# Patient Record
Sex: FEMALE
[De-identification: names, ages, dates, MRNs, and addresses within clinical notes are randomized per-mention and may not be internally consistent; named-entity substitution may affect disease eponyms.]

---

## 2017-07-24 ENCOUNTER — RX ONLY (RX ONLY)
Age: 62
End: 2017-07-24

## 2019-04-29 PROBLEM — D48.5 NEOPLASM OF UNCERTAIN BEHAVIOR OF SKIN: Status: ACTIVE | Noted: 2019-04-29

## 2019-11-12 ENCOUNTER — RX ONLY (RX ONLY)
Age: 64
End: 2019-11-12

## 2019-11-12 ENCOUNTER — DOCTOR'S OFFICE (OUTPATIENT)
Dept: URBAN - METROPOLITAN AREA CLINIC 137 | Facility: CLINIC | Age: 64
Setting detail: OPHTHALMOLOGY
End: 2019-11-12

## 2019-11-12 DIAGNOSIS — H52.223: ICD-10-CM

## 2019-11-12 DIAGNOSIS — H52.03: ICD-10-CM

## 2019-11-12 DIAGNOSIS — H25.13: ICD-10-CM

## 2019-11-12 PROCEDURE — SELFPAYVIS VISION VISIT SELF PAY: Performed by: OPTOMETRIST

## 2019-11-12 ASSESSMENT — SPHEQUIV_DERIVED
OD_SPHEQUIV: 6.25
OS_SPHEQUIV: 4.25
OS_SPHEQUIV: 4.25
OD_SPHEQUIV: 6

## 2019-11-12 ASSESSMENT — REFRACTION_CURRENTRX
OD_ADD: +2.75
OD_VPRISM_DIRECTION: PROGS
OS_CYLINDER: SPHERE
OS_OVR_VA: 20/
OD_CYLINDER: -1.75
OS_VPRISM_DIRECTION: PROGS
OD_AXIS: 084
OS_OVR_VA: 20/
OD_OVR_VA: 20/
OD_SPHERE: +6.75
OS_SPHERE: +3.75
OD_OVR_VA: 20/
OS_ADD: +2.75
OD_OVR_VA: 20/
OS_OVR_VA: 20/

## 2019-11-12 ASSESSMENT — REFRACTION_MANIFEST
OS_VA3: 20/
OS_CYLINDER: -0.50
OD_AXIS: 85
OS_ADD: +2.50
OD_VA2: 20/
OU_VA: 20/
OS_VA2: 20/
OS_VA1: 20/20
OS_VA2: 20/
OD_ADD: +2.50
OS_VA3: 20/
OU_VA: 20/
OS_SPHERE: +4.50
OS_AXIS: 155
OD_VA3: 20/
OD_SPHERE: +6.75
OS_VA1: 20/
OD_VA1: 20/40
OD_VA2: 20/
OD_CYLINDER: -1.50
OD_VA1: 20/
OD_VA3: 20/

## 2019-11-12 ASSESSMENT — KERATOMETRY
OS_K2POWER_DIOPTERS: 45.00
OS_AXISANGLE_DEGREES: 004
OD_K1POWER_DIOPTERS: 43.75
OD_AXISANGLE_DEGREES: 080
OD_K2POWER_DIOPTERS: 45.00
OS_K1POWER_DIOPTERS: 44.00

## 2019-11-12 ASSESSMENT — CONFRONTATIONAL VISUAL FIELD TEST (CVF)
OD_FINDINGS: FULL
OS_FINDINGS: FULL

## 2019-11-12 ASSESSMENT — AXIALLENGTH_DERIVED
OS_AL: 21.7286
OS_AL: 21.7286
OD_AL: 21.1238
OD_AL: 21.2022

## 2019-11-12 ASSESSMENT — REFRACTION_AUTOREFRACTION
OD_AXIS: 088
OS_SPHERE: +4.50
OS_CYLINDER: -0.50
OD_CYLINDER: -1.00
OD_SPHERE: +6.75
OS_AXIS: 146

## 2019-11-12 ASSESSMENT — VISUAL ACUITY
OS_BCVA: 20/40-1
OD_BCVA: 20/20-1

## 2019-11-21 ENCOUNTER — DOCTOR'S OFFICE (OUTPATIENT)
Dept: URBAN - METROPOLITAN AREA CLINIC 137 | Facility: CLINIC | Age: 64
Setting detail: OPHTHALMOLOGY
End: 2019-11-21
Payer: COMMERCIAL

## 2019-11-21 DIAGNOSIS — H25.13: ICD-10-CM

## 2019-11-21 DIAGNOSIS — H04.121: ICD-10-CM

## 2019-11-21 DIAGNOSIS — H01.004: ICD-10-CM

## 2019-11-21 DIAGNOSIS — H04.122: ICD-10-CM

## 2019-11-21 DIAGNOSIS — H01.001: ICD-10-CM

## 2019-11-21 DIAGNOSIS — H04.123: ICD-10-CM

## 2019-11-21 PROCEDURE — 83861 MICROFLUID ANALY TEARS: CPT | Performed by: OPTOMETRIST

## 2019-11-21 PROCEDURE — 99213 OFFICE O/P EST LOW 20 MIN: CPT | Performed by: OPTOMETRIST

## 2019-11-21 ASSESSMENT — REFRACTION_MANIFEST
OU_VA: 20/
OD_SPHERE: +6.75
OS_CYLINDER: -0.50
OD_VA3: 20/
OS_VA1: 20/20
OD_AXIS: 85
OD_ADD: +2.50
OS_VA2: 20/
OS_VA1: 20/
OS_VA3: 20/
OD_VA1: 20/40
OS_VA3: 20/
OS_SPHERE: +4.50
OD_VA1: 20/
OS_ADD: +2.50
OU_VA: 20/
OD_CYLINDER: -1.50
OD_VA2: 20/
OD_VA3: 20/
OD_VA2: 20/
OS_AXIS: 155
OS_VA2: 20/

## 2019-11-21 ASSESSMENT — SPHEQUIV_DERIVED
OS_SPHEQUIV: 4.25
OD_SPHEQUIV: 6.25
OS_SPHEQUIV: 4.25
OD_SPHEQUIV: 6

## 2019-11-21 ASSESSMENT — REFRACTION_CURRENTRX
OD_OVR_VA: 20/
OS_ADD: +2.75
OD_ADD: +2.75
OD_SPHERE: +6.75
OS_SPHERE: +3.75
OS_VPRISM_DIRECTION: PROGS
OD_VPRISM_DIRECTION: PROGS
OS_OVR_VA: 20/
OS_OVR_VA: 20/
OD_OVR_VA: 20/
OS_OVR_VA: 20/
OD_OVR_VA: 20/
OD_CYLINDER: -1.75
OS_CYLINDER: SPHERE
OD_AXIS: 084

## 2019-11-21 ASSESSMENT — REFRACTION_AUTOREFRACTION
OS_AXIS: 146
OD_AXIS: 088
OS_CYLINDER: -0.50
OD_CYLINDER: -1.00
OD_SPHERE: +6.75
OS_SPHERE: +4.50

## 2019-11-21 ASSESSMENT — CONFRONTATIONAL VISUAL FIELD TEST (CVF)
OD_FINDINGS: FULL
OS_FINDINGS: FULL

## 2019-11-21 ASSESSMENT — VISUAL ACUITY
OS_BCVA: 20/40-1
OD_BCVA: 20/25-1

## 2019-12-06 ENCOUNTER — OFFICE VISIT (OUTPATIENT)
Dept: INTERNAL MEDICINE CLINIC | Facility: CLINIC | Age: 64
End: 2019-12-06
Payer: COMMERCIAL

## 2019-12-06 VITALS
DIASTOLIC BLOOD PRESSURE: 76 MMHG | OXYGEN SATURATION: 96 % | RESPIRATION RATE: 16 BRPM | SYSTOLIC BLOOD PRESSURE: 134 MMHG | BODY MASS INDEX: 30.92 KG/M2 | WEIGHT: 197 LBS | HEIGHT: 67 IN | HEART RATE: 101 BPM | TEMPERATURE: 98.1 F

## 2019-12-06 DIAGNOSIS — Z12.39 SCREENING FOR MALIGNANT NEOPLASM OF BREAST: ICD-10-CM

## 2019-12-06 DIAGNOSIS — Z13.21 ENCOUNTER FOR SCREENING FOR NUTRITIONAL DISORDER: ICD-10-CM

## 2019-12-06 DIAGNOSIS — M79.7 FIBROMYALGIA: ICD-10-CM

## 2019-12-06 DIAGNOSIS — Z00.00 HEALTHCARE MAINTENANCE: Primary | ICD-10-CM

## 2019-12-06 DIAGNOSIS — F33.0 MILD EPISODE OF RECURRENT MAJOR DEPRESSIVE DISORDER (HCC): ICD-10-CM
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
## 2019-12-06 DIAGNOSIS — L65.9 HAIR LOSS: ICD-10-CM

## 2019-12-06 DIAGNOSIS — Z11.59 ENCOUNTER FOR HEPATITIS C SCREENING TEST FOR LOW RISK PATIENT: ICD-10-CM

## 2019-12-06 DIAGNOSIS — E66.09 CLASS 1 OBESITY DUE TO EXCESS CALORIES WITHOUT SERIOUS COMORBIDITY WITH BODY MASS INDEX (BMI) OF 31.0 TO 31.9 IN ADULT: ICD-10-CM

## 2019-12-06 DIAGNOSIS — Z12.11 SCREENING FOR MALIGNANT NEOPLASM OF COLON: ICD-10-CM

## 2019-12-06 PROBLEM — F32.A DEPRESSION: Status: ACTIVE | Noted: 2019-12-06

## 2019-12-06 PROCEDURE — 99386 PREV VISIT NEW AGE 40-64: CPT | Performed by: NURSE PRACTITIONER

## 2019-12-06 PROCEDURE — 1036F TOBACCO NON-USER: CPT | Performed by: NURSE PRACTITIONER

## 2019-12-06 PROCEDURE — 99214 OFFICE O/P EST MOD 30 MIN: CPT | Performed by: NURSE PRACTITIONER

## 2019-12-06 PROCEDURE — 3008F BODY MASS INDEX DOCD: CPT | Performed by: NURSE PRACTITIONER

## 2019-12-06 RX ORDER — BUPROPION HYDROCHLORIDE 150 MG/1
150 TABLET ORAL DAILY
Qty: 30 TABLET | Refills: 2 | Status: SHIPPED | OUTPATIENT
Start: 2019-12-06 | End: 2020-04-15 | Stop reason: SDUPTHER

## 2019-12-06 RX ORDER — DICYCLOMINE HYDROCHLORIDE 10 MG/1
10 CAPSULE ORAL
COMMUNITY
End: 2021-05-27 | Stop reason: SDUPTHER

## 2019-12-06 RX ORDER — BUPROPION HYDROCHLORIDE 150 MG/1
150 TABLET ORAL DAILY
COMMUNITY
End: 2019-12-06 | Stop reason: SDUPTHER

## 2019-12-06 RX ORDER — MONTELUKAST SODIUM 10 MG/1
10 TABLET ORAL
COMMUNITY
End: 2021-01-19 | Stop reason: SDUPTHER

## 2019-12-06 RX ORDER — CYCLOBENZAPRINE HCL 5 MG
5 TABLET ORAL
COMMUNITY

## 2019-12-06 RX ORDER — LEVOCETIRIZINE DIHYDROCHLORIDE 5 MG/1
5 TABLET, FILM COATED ORAL EVERY EVENING
COMMUNITY
End: 2021-05-28 | Stop reason: ALTCHOICE

## 2019-12-06 NOTE — PROGRESS NOTES
Assessment/Plan:    Depression  Continue wellbutrin    Tick bite  Watch for bulls eye rash and flu like symptoms  Check for lyme disease after 4 weeks    Fibromyalgia  Takes tramadol prn    Hair loss  Blood work ordered  Try biotin and mvi       Diagnoses and all orders for this visit:    Healthcare maintenance  -     Lipid panel; Future    Screening for malignant neoplasm of breast  -     Mammo screening bilateral w cad; Future    Tick bite, initial encounter  -     Lyme Antibody Profile with reflex to WB; Future    Screening for malignant neoplasm of colon  -     Ambulatory referral to Gastroenterology; Future    Hair loss  -     Comprehensive metabolic panel; Future  -     CBC and differential; Future  -     TSH, 3rd generation with Free T4 reflex; Future  -     Ferritin; Future    Encounter for screening for nutritional disorder  -     Vitamin D 25 hydroxy; Future    Encounter for hepatitis C screening test for low risk patient  -     Hepatitis C antibody; Future    Mild episode of recurrent major depressive disorder (HCC)  -     buPROPion (WELLBUTRIN XL) 150 mg 24 hr tablet; Take 1 tablet (150 mg total) by mouth daily    Class 1 obesity due to excess calories without serious comorbidity with body mass index (BMI) of 31 0 to 31 9 in adult    Fibromyalgia    Other orders  -     Milnacipran HCl (SAVELLA) 50 MG TABS; Take by mouth 2 (two) times a day  -     cyclobenzaprine (FLEXERIL) 5 mg tablet; Take 5 mg by mouth daily at bedtime  -     Discontinue: buPROPion (WELLBUTRIN XL) 150 mg 24 hr tablet; Take 150 mg by mouth daily  -     montelukast (SINGULAIR) 10 mg tablet; Take 10 mg by mouth daily at bedtime  -     levocetirizine (XYZAL) 5 MG tablet; Take 5 mg by mouth every evening  -     calcium-vitamin D (OSCAL) 250-125 MG-UNIT per tablet; Take 3 tablets by mouth daily  -     dicyclomine (BENTYL) 10 mg capsule;  Take 10 mg by mouth 4 (four) times a day (before meals and at bedtime)          Subjective:      Patient ID: Leobardo Beltran is a 59 y o  female  Patient is here to establish care    Has a tick bite left upper arm  No flu like symptoms, no bulls eye rash  she removed the tick  Its been about a week    Depression- takes wellbutrin  Stable    Fibromyalgia- takes tramadol prn    Hair loss- she is under stress but she is losing more hair recently  She moved from [facility] recently and she is under stress      The following portions of the patient's history were reviewed and updated as appropriate: allergies, current medications, past family history, past medical history, past social history, past surgical history and problem list     Review of Systems   Constitutional: Negative  HENT: Negative  Eyes: Negative  Respiratory: Negative  Cardiovascular: Negative  Gastrointestinal: Negative  Musculoskeletal: Negative  Neurological: Negative  Objective:      /76   Pulse 101   Temp 98 1 °F (36 7 °C) (Oral)   Resp 16   Ht 5' 6 73" (1 695 m)   Wt 89 4 kg (197 lb)   SpO2 96%   BMI 31 10 kg/m²          Physical Exam   Constitutional: She is oriented to person, place, and time  She appears well-developed and well-nourished  HENT:   Head: Normocephalic and atraumatic  Right Ear: External ear normal    Left Ear: External ear normal    Nose: Nose normal    Mouth/Throat: Oropharynx is clear and moist    Eyes: Pupils are equal, round, and reactive to light  Conjunctivae are normal    Neck: Normal range of motion  Neck supple  Cardiovascular: Normal rate and regular rhythm  Pulmonary/Chest: Effort normal and breath sounds normal    Abdominal: Soft  Bowel sounds are normal    Musculoskeletal: Normal range of motion  Neurological: She is alert and oriented to person, place, and time  Skin: Skin is warm and dry  Nursing note and vitals reviewed

## 2019-12-06 NOTE — PATIENT INSTRUCTIONS
Eat healthy, less junk food and sodium  Drink more water stay hydrated  Wait four weeks before going for bloodwork  Add MVI and biotin for hair loss  Wellness Visit for Adults   AMBULATORY CARE:   A wellness visit  is when you see your healthcare provider to get screened for health problems  You can also get advice on how to stay healthy  Write down your questions so you remember to ask them  Ask your healthcare provider how often you should have a wellness visit  What happens at a wellness visit:  Your healthcare provider will ask about your health, and your family history of health problems  This includes high blood pressure, heart disease, and cancer  He or she will ask if you have symptoms that concern you, if you smoke, and about your mood  You may also be asked about your intake of medicines, supplements, food, and alcohol  Any of the following may be done:  · Your weight  will be checked  Your height may also be checked so your body mass index (BMI) can be calculated  Your BMI shows if you are at a healthy weight  · Your blood pressure  and heart rate will be checked  Your temperature may also be checked  · Blood and urine tests  may be done  Blood tests may be done to check your cholesterol levels  Abnormal cholesterol levels increase your risk for heart disease and stroke  You may also need a blood or urine test to check for diabetes if you are at increased risk  Urine tests may be done to look for signs of an infection or kidney disease  · A physical exam  includes checking your heartbeat and lungs with a stethoscope  Your healthcare provider may also check your skin to look for sun damage  · Screening tests  may be recommended  A screening test is done to check for diseases that may not cause symptoms  The screening tests you may need depend on your age, gender, family history, and lifestyle habits   For example, colorectal screening may be recommended if you are 48years old or older   Screening tests you need if you are a woman:   · A Pap smear  is used to screen for cervical cancer  Pap smears are usually done every 3 to 5 years depending on your age  You may need them more often if you have had abnormal Pap smear test results in the past  Ask your healthcare provider how often you should have a Pap smear  · A mammogram  is an x-ray of your breasts to screen for breast cancer  Experts recommend mammograms every 2 years starting at age 48 years  You may need a mammogram at age 52 years or younger if you have an increased risk for breast cancer  Talk to your healthcare provider about when you should start having mammograms and how often you need them  Vaccines you may need:   · Get an influenza vaccine  every year  The influenza vaccine protects you from the flu  Several types of viruses cause the flu  The viruses change over time, so new vaccines are made each year  · Get a tetanus-diphtheria (Td) booster vaccine  every 10 years  This vaccine protects you against tetanus and diphtheria  Tetanus is a severe infection that may cause painful muscle spasms and lockjaw  Diphtheria is a severe bacterial infection that causes a thick covering in the back of your mouth and throat  · Get a human papillomavirus (HPV) vaccine  if you are female and aged 23 to 32 or male 23 to 24 and never received it  This vaccine protects you from HPV infection  HPV is the most common infection spread by sexual contact  HPV may also cause vaginal, penile, and anal cancers  · Get a pneumococcal vaccine  if you are aged 72 years or older  The pneumococcal vaccine is an injection given to protect you from pneumococcal disease  Pneumococcal disease is an infection caused by pneumococcal bacteria  The infection may cause pneumonia, meningitis, or an ear infection  · Get a shingles vaccine  if you are aged 61 or older, even if you have had shingles before   The shingles vaccine is an injection to protect you from the varicella-zoster virus  This is the same virus that causes chickenpox  Shingles is a painful rash that develops in people who had chickenpox or have been exposed to the virus  How to eat healthy:  My Plate is a model for planning healthy meals  It shows the types and amounts of foods that should go on your plate  Fruits and vegetables make up about half of your plate, and grains and protein make up the other half  A serving of dairy is included on the side of your plate  The amount of calories and serving sizes you need depends on your age, gender, weight, and height  Examples of healthy foods are listed below:  · Eat a variety of vegetables  such as dark green, red, and orange vegetables  You can also include canned vegetables low in sodium (salt) and frozen vegetables without added butter or sauces  · Eat a variety of fresh fruits , canned fruit in 100% juice, frozen fruit, and dried fruit  · Include whole grains  At least half of the grains you eat should be whole grains  Examples include whole-wheat bread, wheat pasta, brown rice, and whole-grain cereals such as oatmeal     · Eat a variety of protein foods such as seafood (fish and shellfish), lean meat, and poultry without skin (turkey and chicken)  Examples of lean meats include pork leg, shoulder, or tenderloin, and beef round, sirloin, tenderloin, and extra lean ground beef  Other protein foods include eggs and egg substitutes, beans, peas, soy products, nuts, and seeds  · Choose low-fat dairy products such as skim or 1% milk or low-fat yogurt, cheese, and cottage cheese  · Limit unhealthy fats  such as butter, hard margarine, and shortening  Exercise:  Exercise at least 30 minutes per day on most days of the week  Some examples of exercise include walking, biking, dancing, and swimming  You can also fit in more physical activity by taking the stairs instead of the elevator or parking farther away from stores  Include muscle strengthening activities 2 days each week  Regular exercise provides many health benefits  It helps you manage your weight, and decreases your risk for type 2 diabetes, heart disease, stroke, and high blood pressure  Exercise can also help improve your mood  Ask your healthcare provider about the best exercise plan for you  General health and safety guidelines:   · Do not smoke  Nicotine and other chemicals in cigarettes and cigars can cause lung damage  Ask your healthcare provider for information if you currently smoke and need help to quit  E-cigarettes or smokeless tobacco still contain nicotine  Talk to your healthcare provider before you use these products  · Limit alcohol  A drink of alcohol is 12 ounces of beer, 5 ounces of wine, or 1½ ounces of liquor  · Lose weight, if needed  Being overweight increases your risk of certain health conditions  These include heart disease, high blood pressure, type 2 diabetes, and certain types of cancer  · Protect your skin  Do not sunbathe or use tanning beds  Use sunscreen with a SPF 15 or higher  Apply sunscreen at least 15 minutes before you go outside  Reapply sunscreen every 2 hours  Wear protective clothing, hats, and sunglasses when you are outside  · Drive safely  Always wear your seatbelt  Make sure everyone in your car wears a seatbelt  A seatbelt can save your life if you are in an accident  Do not use your cell phone when you are driving  This could distract you and cause an accident  Pull over if you need to make a call or send a text message  · Practice safe sex  Use latex condoms if are sexually active and have more than one partner  Your healthcare provider may recommend screening tests for sexually transmitted infections (STIs)  · Wear helmets, lifejackets, and protective gear  Always wear a helmet when you ride a bike or motorcycle, go skiing, or play sports that could cause a head injury   Wear protective equipment when you play sports  Wear a lifejacket when you are on a boat or doing water sports  © 2017 [employer] Information is for End User's use only and may not be sold, redistributed or otherwise used for commercial purposes  All illustrations and images included in CareNotes® are the copyrighted property of [employer]  or [employer]i [employer]  The above information is an  only  It is not intended as medical advice for individual conditions or treatments  Talk to your doctor, nurse or pharmacist before following any medical regimen to see if it is safe and effective for you
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
## 2019-12-08 PROBLEM — L65.9 HAIR LOSS: Status: ACTIVE | Noted: 2019-12-08

## 2019-12-08 NOTE — PROGRESS NOTES
[facility]    NAME: Erin Hernández  AGE: 59 y o  SEX: female  : 1955     DATE: 2019     Assessment and Plan:     Problem List Items Addressed This Visit        Musculoskeletal and Integument    Tick bite     Watch for bulls eye rash and flu like symptoms  Check for lyme disease after 4 weeks         Relevant Orders    Lyme Antibody Profile with reflex to WB       Other    Fibromyalgia     Takes tramadol prn         Depression     Continue wellbutrin         Relevant Medications    Milnacipran HCl (SAVELLA) 50 MG TABS    buPROPion (WELLBUTRIN XL) 150 mg 24 hr tablet    Hair loss     Blood work ordered  Try biotin and mvi         Relevant Orders    Comprehensive metabolic panel    CBC and differential    TSH, 3rd generation with Free T4 reflex    Ferritin      Other Visit Diagnoses     Healthcare maintenance    -  Primary    Relevant Orders    Lipid panel    Screening for malignant neoplasm of breast        Relevant Orders    Mammo screening bilateral w cad    Screening for malignant neoplasm of colon        Relevant Orders    Ambulatory referral to Gastroenterology    Encounter for screening for nutritional disorder        Relevant Orders    Vitamin D 25 hydroxy    Encounter for hepatitis C screening test for low risk patient        Relevant Orders    Hepatitis C antibody    Class 1 obesity due to excess calories without serious comorbidity with body mass index (BMI) of 31 0 to 31 9 in adult              Immunizations and preventive care screenings were discussed with patient today  Appropriate education was printed on patient's after visit summary  Counseling:  · Exercise: the importance of regular exercise/physical activity was discussed  Recommend exercise 3-5 times per week for at least 30 minutes  Return in about 6 months (around 2020)       Chief Complaint:     Chief Complaint   Patient presents with  Annual Exam    Tick Removal     The patient had a tick on her left arm  History of Present Illness:     Adult Annual Physical   Patient here for a comprehensive physical exam  The patient reports no problems  Diet and Physical Activity  · Diet/Nutrition: well balanced diet  · Exercise: walking  Depression Screening  PHQ-9 Depression Screening    PHQ-9:    Frequency of the following problems over the past two weeks:       Little interest or pleasure in doing things:  1 - several days  Feeling down, depressed, or hopeless:  1 - several days  PHQ-2 Score:  2       General Health  · Sleep: sleeps well  · Hearing: normal - bilateral   · Vision: no vision problems  · Dental: regular dental visits  ·       Review of Systems:     Review of Systems   Constitutional: Negative  HENT: Negative  Eyes: Negative  Respiratory: Negative  Cardiovascular: Negative  Gastrointestinal: Negative  Musculoskeletal: Positive for myalgias  Neurological: Negative         Past Medical History:     Past Medical History:   Diagnosis Date    Allergic     Arthritis     Depression     Fibromyalgia     Inflammatory bowel disease     Visual impairment       Past Surgical History:     Past Surgical History:   Procedure Laterality Date    HYSTERECTOMY      REPAIR RECTOCELE        Social History:     Social History     Socioeconomic History    Marital status: /Civil Union     Spouse name: None    Number of children: None    Years of education: None    Highest education level: None   Occupational History    None   Social Needs    Financial resource strain: None    Food insecurity:     Worry: None     Inability: None    Transportation needs:     Medical: None     Non-medical: None   Tobacco Use    Smoking status: Former Smoker     Packs/day: 1 00     Years: 8 00     Pack years: 8 00     Types: Cigarettes     Start date: 12/6/1971     Last attempt to quit: 12/6/1979     Years since quittin 0    Smokeless tobacco: Never Used   Substance and Sexual Activity    Alcohol use: Yes     Frequency: Monthly or less     Drinks per session: 1 or 2    Drug use: Never    Sexual activity: Yes   Lifestyle    Physical activity:     Days per week: None     Minutes per session: None    Stress: None   Relationships    Social connections:     Talks on phone: None     Gets together: None     Attends Yazidi service: None     Active member of club or organization: None     Attends meetings of clubs or organizations: None     Relationship status: None    Intimate partner violence:     Fear of current or ex partner: None     Emotionally abused: None     Physically abused: None     Forced sexual activity: None   Other Topics Concern    None   Social History Narrative    None      Family History:     Family History   Problem Relation Age of Onset    Cancer Mother     Dementia Father     Heart disease Father     Arthritis Father     Skin cancer Father     Skin cancer Sister     Alcohol abuse Brother       Current Medications:     Current Outpatient Medications   Medication Sig Dispense Refill    buPROPion (WELLBUTRIN XL) 150 mg 24 hr tablet Take 1 tablet (150 mg total) by mouth daily 30 tablet 2    calcium-vitamin D (OSCAL) 250-125 MG-UNIT per tablet Take 3 tablets by mouth daily      cyclobenzaprine (FLEXERIL) 5 mg tablet Take 5 mg by mouth daily at bedtime      dicyclomine (BENTYL) 10 mg capsule Take 10 mg by mouth 4 (four) times a day (before meals and at bedtime)      levocetirizine (XYZAL) 5 MG tablet Take 5 mg by mouth every evening      Milnacipran HCl (SAVELLA) 50 MG TABS Take by mouth 2 (two) times a day      montelukast (SINGULAIR) 10 mg tablet Take 10 mg by mouth daily at bedtime       No current facility-administered medications for this visit  Allergies:      Allergies   Allergen Reactions    Penicillins Hives      Physical Exam:     /76   Pulse 101   Temp 98 1 °F (36 7 °C) (Oral)   Resp 16   Ht 5' 6 73" (1 695 m)   Wt 89 4 kg (197 lb)   SpO2 96%   BMI 31 10 kg/m²     Physical Exam   Constitutional: She is oriented to person, place, and time  She appears well-developed and well-nourished  HENT:   Head: Normocephalic and atraumatic  Right Ear: External ear normal    Left Ear: External ear normal    Nose: Nose normal    Mouth/Throat: Oropharynx is clear and moist    Eyes: Pupils are equal, round, and reactive to light  Conjunctivae are normal    Neck: Normal range of motion  Neck supple  Cardiovascular: Normal rate and regular rhythm  Pulmonary/Chest: Effort normal and breath sounds normal    Abdominal: Soft  Bowel sounds are normal    Musculoskeletal: Normal range of motion  Neurological: She is alert and oriented to person, place, and time  Skin: Skin is warm and dry  Nursing note and vitals reviewed        MIKAL Galaviz  [facility]

## 2020-01-20 ENCOUNTER — DOCTOR'S OFFICE (OUTPATIENT)
Dept: URBAN - METROPOLITAN AREA CLINIC 136 | Facility: CLINIC | Age: 65
Setting detail: OPHTHALMOLOGY
End: 2020-01-20
Payer: COMMERCIAL

## 2020-01-20 ENCOUNTER — RX ONLY (RX ONLY)
Age: 65
End: 2020-01-20

## 2020-01-20 DIAGNOSIS — H04.123: ICD-10-CM

## 2020-01-20 DIAGNOSIS — H25.13: ICD-10-CM

## 2020-01-20 DIAGNOSIS — H04.121: ICD-10-CM

## 2020-01-20 DIAGNOSIS — H01.004: ICD-10-CM

## 2020-01-20 DIAGNOSIS — H04.122: ICD-10-CM

## 2020-01-20 DIAGNOSIS — H01.001: ICD-10-CM

## 2020-01-20 PROCEDURE — 68761 CLOSE TEAR DUCT OPENING: CPT | Performed by: OPHTHALMOLOGY

## 2020-01-20 PROCEDURE — 92012 INTRM OPH EXAM EST PATIENT: CPT | Performed by: OPHTHALMOLOGY

## 2020-01-20 ASSESSMENT — REFRACTION_MANIFEST
OD_VA1: 20/40
OD_CYLINDER: -1.50
OU_VA: 20/
OS_SPHERE: +4.50
OS_ADD: +2.50
OS_VA3: 20/
OS_VA2: 20/
OS_AXIS: 155
OS_VA1: 20/20
OS_CYLINDER: -0.50
OD_VA3: 20/
OD_ADD: +2.50
OD_VA2: 20/
OD_SPHERE: +6.75
OD_AXIS: 85

## 2020-01-20 ASSESSMENT — REFRACTION_CURRENTRX
OS_VPRISM_DIRECTION: PROGS
OD_OVR_VA: 20/
OD_CYLINDER: -1.75
OD_VPRISM_DIRECTION: PROGS
OD_SPHERE: +6.75
OD_AXIS: 084
OS_SPHERE: +3.75
OS_OVR_VA: 20/
OS_ADD: +2.75
OS_CYLINDER: SPHERE
OD_ADD: +2.75

## 2020-01-20 ASSESSMENT — REFRACTION_AUTOREFRACTION
OS_CYLINDER: -0.50
OS_AXIS: 146
OS_SPHERE: +4.50
OD_SPHERE: +6.75
OD_AXIS: 088
OD_CYLINDER: -1.00

## 2020-01-20 ASSESSMENT — KERATOMETRY
OD_K1POWER_DIOPTERS: 43.75
OS_AXISANGLE_DEGREES: 004
OS_K2POWER_DIOPTERS: 45.00
OD_K2POWER_DIOPTERS: 45.00
OD_AXISANGLE_DEGREES: 080
OS_K1POWER_DIOPTERS: 44.00

## 2020-01-20 ASSESSMENT — SPHEQUIV_DERIVED
OD_SPHEQUIV: 6.25
OS_SPHEQUIV: 4.25
OS_SPHEQUIV: 4.25
OD_SPHEQUIV: 6

## 2020-01-20 ASSESSMENT — SUPERFICIAL PUNCTATE KERATITIS (SPK)
OD_SPK: 2+
OS_SPK: 1+

## 2020-01-20 ASSESSMENT — LID EXAM ASSESSMENTS
OD_BLEPHARITIS: RLL T
OS_BLEPHARITIS: LLL T

## 2020-01-20 ASSESSMENT — AXIALLENGTH_DERIVED
OS_AL: 21.7286
OS_AL: 21.7286
OD_AL: 21.2022
OD_AL: 21.1238

## 2020-01-20 ASSESSMENT — VISUAL ACUITY
OS_BCVA: 20/25-2
OD_BCVA: 20/20

## 2020-01-20 ASSESSMENT — CONFRONTATIONAL VISUAL FIELD TEST (CVF)
OS_FINDINGS: FULL
OD_FINDINGS: FULL

## 2020-01-31 ENCOUNTER — DOCTOR'S OFFICE (OUTPATIENT)
Dept: URBAN - METROPOLITAN AREA CLINIC 136 | Facility: CLINIC | Age: 65
Setting detail: OPHTHALMOLOGY
End: 2020-01-31
Payer: COMMERCIAL

## 2020-01-31 DIAGNOSIS — H01.001: ICD-10-CM

## 2020-01-31 DIAGNOSIS — H01.004: ICD-10-CM

## 2020-01-31 DIAGNOSIS — H04.123: ICD-10-CM

## 2020-01-31 PROBLEM — H53.001 AMBLYOPIA NOS; RIGHT EYE: Status: ACTIVE | Noted: 2019-11-12

## 2020-01-31 PROBLEM — H04.122 DRY EYE; RIGHT EYE, LEFT EYE, BOTH EYES: Status: ACTIVE | Noted: 2020-01-20

## 2020-01-31 PROBLEM — H52.03 HYPEROPIA; BOTH EYES: Status: ACTIVE | Noted: 2019-11-12

## 2020-01-31 PROBLEM — H25.13 CATARACT NUCLEAR SCLEROSIS; BOTH EYES: Status: ACTIVE | Noted: 2019-11-12

## 2020-01-31 PROBLEM — H04.121 DRY EYE; RIGHT EYE, LEFT EYE, BOTH EYES: Status: ACTIVE | Noted: 2020-01-20

## 2020-01-31 PROBLEM — H52.223 ASTIGMATISM, REGULAR; BOTH EYES: Status: ACTIVE | Noted: 2019-11-12

## 2020-01-31 PROCEDURE — 92012 INTRM OPH EXAM EST PATIENT: CPT | Performed by: OPHTHALMOLOGY

## 2020-01-31 ASSESSMENT — REFRACTION_AUTOREFRACTION
OS_SPHERE: +4.50
OS_CYLINDER: -0.50
OD_CYLINDER: -1.00
OD_SPHERE: +6.75
OD_AXIS: 088
OS_AXIS: 146

## 2020-01-31 ASSESSMENT — VISUAL ACUITY
OS_BCVA: 20/30+1
OD_BCVA: 20/20-2

## 2020-01-31 ASSESSMENT — SUPERFICIAL PUNCTATE KERATITIS (SPK)
OS_SPK: T
OD_SPK: T

## 2020-01-31 ASSESSMENT — REFRACTION_CURRENTRX
OS_CYLINDER: SPHERE
OD_CYLINDER: -1.75
OD_VPRISM_DIRECTION: PROGS
OD_SPHERE: +6.75
OD_ADD: +2.75
OD_OVR_VA: 20/
OS_VPRISM_DIRECTION: PROGS
OS_SPHERE: +3.75
OS_ADD: +2.75
OD_AXIS: 084
OS_OVR_VA: 20/

## 2020-01-31 ASSESSMENT — REFRACTION_MANIFEST
OD_VA3: 20/
OS_CYLINDER: -0.50
OS_ADD: +2.50
OS_AXIS: 155
OS_VA2: 20/
OD_SPHERE: +6.75
OS_SPHERE: +4.50
OD_CYLINDER: -1.50
OU_VA: 20/
OD_ADD: +2.50
OS_VA3: 20/
OD_VA2: 20/
OD_AXIS: 85
OD_VA1: 20/40
OS_VA1: 20/20

## 2020-01-31 ASSESSMENT — KERATOMETRY
OS_AXISANGLE_DEGREES: 004
OD_AXISANGLE_DEGREES: 080
OD_K2POWER_DIOPTERS: 45.00
OS_K1POWER_DIOPTERS: 44.00
OS_K2POWER_DIOPTERS: 45.00
OD_K1POWER_DIOPTERS: 43.75

## 2020-01-31 ASSESSMENT — SPHEQUIV_DERIVED
OD_SPHEQUIV: 6
OD_SPHEQUIV: 6.25
OS_SPHEQUIV: 4.25
OS_SPHEQUIV: 4.25

## 2020-01-31 ASSESSMENT — LID EXAM ASSESSMENTS
OS_BLEPHARITIS: LLL T
OD_BLEPHARITIS: RLL T

## 2020-01-31 ASSESSMENT — CONFRONTATIONAL VISUAL FIELD TEST (CVF)
OS_FINDINGS: FULL
OD_FINDINGS: FULL

## 2020-01-31 ASSESSMENT — AXIALLENGTH_DERIVED
OS_AL: 21.7286
OD_AL: 21.1238
OS_AL: 21.7286
OD_AL: 21.2022

## 2020-04-15 ENCOUNTER — TELEMEDICINE (OUTPATIENT)
Dept: INTERNAL MEDICINE CLINIC | Facility: CLINIC | Age: 65
End: 2020-04-15
Payer: COMMERCIAL

## 2020-04-15 DIAGNOSIS — M79.7 FIBROMYALGIA: Primary | ICD-10-CM

## 2020-04-15 DIAGNOSIS — F33.0 MILD EPISODE OF RECURRENT MAJOR DEPRESSIVE DISORDER (HCC): ICD-10-CM

## 2020-04-15 DIAGNOSIS — N95.1 SYMPTOMS, SUCH AS FLUSHING, SLEEPLESSNESS, HEADACHE, LACK OF CONCENTRATION, ASSOCIATED WITH THE MENOPAUSE: ICD-10-CM

## 2020-04-15 PROCEDURE — 99213 OFFICE O/P EST LOW 20 MIN: CPT | Performed by: NURSE PRACTITIONER

## 2020-04-15 RX ORDER — ESTERIFIED ESTROGEN AND METHYLTESTOSTERONE .625; 1.25 MG/1; MG/1
TABLET ORAL
Qty: 45 TABLET | Refills: 2 | Status: SHIPPED | OUTPATIENT
Start: 2020-04-15 | End: 2020-11-04 | Stop reason: SDUPTHER

## 2020-04-15 RX ORDER — BUPROPION HYDROCHLORIDE 150 MG/1
150 TABLET ORAL DAILY
Qty: 30 TABLET | Refills: 2 | Status: SHIPPED | OUTPATIENT
Start: 2020-04-15 | End: 2020-07-13

## 2020-04-15 RX ORDER — TRAMADOL HYDROCHLORIDE 50 MG/1
50 TABLET ORAL EVERY 6 HOURS PRN
Qty: 30 TABLET | Refills: 0 | Status: SHIPPED | OUTPATIENT
Start: 2020-04-15 | End: 2021-01-19 | Stop reason: SDUPTHER

## 2020-04-22 ENCOUNTER — DOCUMENTATION (OUTPATIENT)
Dept: INTERNAL MEDICINE CLINIC | Facility: CLINIC | Age: 65
End: 2020-04-22

## 2020-07-12 DIAGNOSIS — F33.0 MILD EPISODE OF RECURRENT MAJOR DEPRESSIVE DISORDER (HCC): ICD-10-CM

## 2020-07-13 RX ORDER — BUPROPION HYDROCHLORIDE 150 MG/1
TABLET ORAL
Qty: 90 TABLET | Refills: 0 | Status: SHIPPED | OUTPATIENT
Start: 2020-07-13 | End: 2020-11-04 | Stop reason: SDUPTHER

## 2020-07-20 ENCOUNTER — OFFICE VISIT (OUTPATIENT)
Dept: INTERNAL MEDICINE CLINIC | Facility: CLINIC | Age: 65
End: 2020-07-20
Payer: COMMERCIAL

## 2020-07-20 VITALS
SYSTOLIC BLOOD PRESSURE: 118 MMHG | DIASTOLIC BLOOD PRESSURE: 64 MMHG | HEART RATE: 106 BPM | HEIGHT: 67 IN | OXYGEN SATURATION: 98 % | BODY MASS INDEX: 31.23 KG/M2 | TEMPERATURE: 98.2 F | WEIGHT: 199 LBS

## 2020-07-20 DIAGNOSIS — R14.0 BLOATING: ICD-10-CM

## 2020-07-20 DIAGNOSIS — R42 VERTIGO: Primary | ICD-10-CM

## 2020-07-20 DIAGNOSIS — J32.0 CHRONIC MAXILLARY SINUSITIS: ICD-10-CM

## 2020-07-20 DIAGNOSIS — K59.09 OTHER CONSTIPATION: ICD-10-CM

## 2020-07-20 PROCEDURE — 3008F BODY MASS INDEX DOCD: CPT | Performed by: NURSE PRACTITIONER

## 2020-07-20 PROCEDURE — 99214 OFFICE O/P EST MOD 30 MIN: CPT | Performed by: NURSE PRACTITIONER

## 2020-07-20 RX ORDER — LACTULOSE 20 G/30ML
10 SOLUTION ORAL DAILY
Qty: 300 ML | Refills: 0 | Status: SHIPPED | OUTPATIENT
Start: 2020-07-20 | End: 2020-08-11

## 2020-07-20 RX ORDER — MECLIZINE HCL 12.5 MG/1
12.5 TABLET ORAL 3 TIMES DAILY PRN
Qty: 30 TABLET | Refills: 0 | Status: SHIPPED | OUTPATIENT
Start: 2020-07-20 | End: 2021-05-28 | Stop reason: ALTCHOICE

## 2020-07-20 RX ORDER — SIMETHICONE 125 MG
125 TABLET,CHEWABLE ORAL EVERY 8 HOURS
Qty: 30 TABLET | Refills: 0 | Status: SHIPPED | OUTPATIENT
Start: 2020-07-20 | End: 2021-05-28 | Stop reason: ALTCHOICE

## 2020-07-27 PROBLEM — R42 VERTIGO: Status: ACTIVE | Noted: 2020-07-27

## 2020-07-27 PROBLEM — K59.09 OTHER CONSTIPATION: Status: ACTIVE | Noted: 2020-07-27

## 2020-07-27 PROBLEM — J32.0 CHRONIC MAXILLARY SINUSITIS: Status: ACTIVE | Noted: 2020-07-27

## 2020-07-27 NOTE — PROGRESS NOTES
Assessment/Plan:    Other constipation  Continue bentyl  Add simethicone and lactulose  Plenty of fluids    Vertigo  Start meclizine  No sudden head movements       Diagnoses and all orders for this visit:    Vertigo  -     meclizine (ANTIVERT) 12 5 MG tablet; Take 1 tablet (12 5 mg total) by mouth 3 (three) times a day as needed for dizziness    Chronic maxillary sinusitis  -     Ambulatory Referral to Otolaryngology; Future    Bloating  -     simethicone (MYLICON) 757 MG chewable tablet; Chew 1 tablet (125 mg total) every 8 (eight) hours    Other constipation  -     lactulose 20 g/30 mL; Take 15 mL (10 g total) by mouth daily          Subjective:      Patient ID: Gregorio King is a 59 y o  female  Patient is here for sudden vertigo since this morning  Worse with head movements    No sob, chest pain, headache  Her sinuses are very congested    She is also bloated and constipated for a week  Taking miralax twice a day and not helping        The following portions of the patient's history were reviewed and updated as appropriate: allergies, current medications, past family history, past medical history, past social history, past surgical history and problem list     Review of Systems   Constitutional: Negative  HENT: Negative  Eyes: Negative  Respiratory: Negative  Cardiovascular: Negative  Gastrointestinal: Positive for abdominal distention, abdominal pain and constipation  Musculoskeletal: Negative  Neurological: Positive for dizziness  Objective:      /64   Pulse (!) 106   Temp 98 2 °F (36 8 °C)   Ht 5' 6 73" (1 695 m)   Wt 90 3 kg (199 lb)   SpO2 98%   BMI 31 42 kg/m²          Physical Exam   Constitutional: She is oriented to person, place, and time  She appears well-developed and well-nourished  HENT:   Head: Normocephalic and atraumatic     Right Ear: External ear normal    Left Ear: External ear normal    Nose: Nose normal    Mouth/Throat: Oropharynx is clear and moist    Eyes: Pupils are equal, round, and reactive to light  Conjunctivae are normal    Neck: Normal range of motion  Neck supple  Cardiovascular: Normal rate and regular rhythm  Pulmonary/Chest: Effort normal and breath sounds normal    Abdominal: Soft  Bowel sounds are normal    Musculoskeletal: Normal range of motion  Neurological: She is alert and oriented to person, place, and time  Skin: Skin is warm and dry  Nursing note and vitals reviewed

## 2020-08-11 DIAGNOSIS — K59.09 OTHER CONSTIPATION: ICD-10-CM

## 2020-08-11 RX ORDER — LACTULOSE 10 G/15ML
SOLUTION ORAL
Qty: 300 ML | Refills: 0 | Status: SHIPPED | OUTPATIENT
Start: 2020-08-11 | End: 2021-05-28 | Stop reason: ALTCHOICE

## 2020-11-04 DIAGNOSIS — F33.0 MILD EPISODE OF RECURRENT MAJOR DEPRESSIVE DISORDER (HCC): ICD-10-CM

## 2020-11-04 DIAGNOSIS — N95.1 SYMPTOMS, SUCH AS FLUSHING, SLEEPLESSNESS, HEADACHE, LACK OF CONCENTRATION, ASSOCIATED WITH THE MENOPAUSE: ICD-10-CM

## 2020-11-05 RX ORDER — BUPROPION HYDROCHLORIDE 150 MG/1
150 TABLET ORAL DAILY
Qty: 90 TABLET | Refills: 0 | Status: SHIPPED | OUTPATIENT
Start: 2020-11-05 | End: 2021-01-19 | Stop reason: SDUPTHER

## 2020-11-05 RX ORDER — ESTERIFIED ESTROGEN AND METHYLTESTOSTERONE .625; 1.25 MG/1; MG/1
TABLET ORAL
Qty: 45 TABLET | Refills: 2 | Status: SHIPPED | OUTPATIENT
Start: 2020-11-05 | End: 2021-06-01 | Stop reason: SDUPTHER

## 2021-01-19 ENCOUNTER — TELEMEDICINE (OUTPATIENT)
Dept: INTERNAL MEDICINE CLINIC | Facility: CLINIC | Age: 66
End: 2021-01-19
Payer: COMMERCIAL

## 2021-01-19 VITALS — BODY MASS INDEX: 32.18 KG/M2 | HEIGHT: 67 IN | WEIGHT: 205 LBS | TEMPERATURE: 98 F

## 2021-01-19 DIAGNOSIS — Z12.11 SCREENING FOR MALIGNANT NEOPLASM OF COLON: ICD-10-CM

## 2021-01-19 DIAGNOSIS — M79.7 FIBROMYALGIA: ICD-10-CM

## 2021-01-19 DIAGNOSIS — T78.40XA ALLERGY, INITIAL ENCOUNTER: ICD-10-CM

## 2021-01-19 DIAGNOSIS — Z12.31 ENCOUNTER FOR SCREENING MAMMOGRAM FOR MALIGNANT NEOPLASM OF BREAST: ICD-10-CM

## 2021-01-19 DIAGNOSIS — F33.0 MILD EPISODE OF RECURRENT MAJOR DEPRESSIVE DISORDER (HCC): Primary | ICD-10-CM

## 2021-01-19 PROCEDURE — 99213 OFFICE O/P EST LOW 20 MIN: CPT | Performed by: NURSE PRACTITIONER

## 2021-01-19 PROCEDURE — 3288F FALL RISK ASSESSMENT DOCD: CPT | Performed by: NURSE PRACTITIONER

## 2021-01-19 RX ORDER — TRAMADOL HYDROCHLORIDE 50 MG/1
50 TABLET ORAL EVERY 12 HOURS
Qty: 30 TABLET | Refills: 0 | Status: SHIPPED | OUTPATIENT
Start: 2021-01-19 | End: 2021-06-01 | Stop reason: SDUPTHER

## 2021-01-19 RX ORDER — METHYLPREDNISOLONE 4 MG/1
4 TABLET ORAL DAILY
COMMUNITY
Start: 2021-01-08 | End: 2021-02-07

## 2021-01-19 RX ORDER — METAXALONE 800 MG/1
800 TABLET ORAL 3 TIMES DAILY PRN
COMMUNITY
End: 2021-05-28 | Stop reason: ALTCHOICE

## 2021-01-19 RX ORDER — BUPROPION HYDROCHLORIDE 300 MG/1
300 TABLET ORAL DAILY
Qty: 90 TABLET | Refills: 2 | Status: SHIPPED | OUTPATIENT
Start: 2021-01-19 | End: 2021-11-10

## 2021-01-19 RX ORDER — MONTELUKAST SODIUM 10 MG/1
10 TABLET ORAL
Qty: 90 TABLET | Refills: 2 | Status: SHIPPED | OUTPATIENT
Start: 2021-01-19 | End: 2021-05-28 | Stop reason: ALTCHOICE

## 2021-01-19 RX ORDER — AMITRIPTYLINE HYDROCHLORIDE 25 MG/1
25 TABLET, FILM COATED ORAL
COMMUNITY
Start: 2020-12-23 | End: 2021-12-23

## 2021-01-29 NOTE — PROGRESS NOTES
Virtual Brief Visit    Assessment/Plan:    Problem List Items Addressed This Visit        Other    Fibromyalgia     Takes amitriptyline, muscle relaxers, and tramadol for pain  Doing well         Relevant Medications    traMADol (ULTRAM) 50 mg tablet    Depression - Primary     Doing well on wellbutrin         Relevant Medications    amitriptyline (ELAVIL) 25 mg tablet    buPROPion (WELLBUTRIN XL) 300 mg 24 hr tablet      Other Visit Diagnoses     Encounter for screening mammogram for malignant neoplasm of breast        Relevant Orders    Mammo screening bilateral w cad    Screening for malignant neoplasm of colon        Relevant Orders    Ambulatory referral to Gastroenterology    Allergy, initial encounter        Relevant Medications    montelukast (SINGULAIR) 10 mg tablet                Reason for visit is   Chief Complaint   Patient presents with    Virtual Brief Visit        Encounter provider MIKAL Rush    Provider located at [address] [place] [id number]    Recent Visits  No visits were found meeting these conditions  Showing recent visits within past 7 days and meeting all other requirements     Future Appointments  No visits were found meeting these conditions  Showing future appointments within next 150 days and meeting all other requirements        After connecting through [employer] and patient was informed that this is a secure, HIPAA-compliant platform  She agrees to proceed  , the patient was identified by name and date of birth  Lorenzajameson Mathieu was informed that this is a telemedicine visit and that the visit is being conducted through [facility] and patient was informed that this is a secure, HIPAA-compliant platform  She agrees to proceed     My office door was closed  No one else was in the room  She acknowledged consent and understanding of privacy and security of the platform   The patient has agreed to participate and understands she can discontinue the visit at any time  Patient is aware this is a billable service  Subjective    Fernando Greer is a 72 y o  female     Patient is here for follow up of depression    On wellbutrin and doing well    Fibromyalgia- takes tramadol, amitriptyline, and muscle relaxers    Due for colonoscopy       Past Medical History:   Diagnosis Date    Allergic     Arthritis     Depression     Fibromyalgia     Inflammatory bowel disease     Visual impairment        Past Surgical History:   Procedure Laterality Date    HYSTERECTOMY      REPAIR RECTOCELE         Current Outpatient Medications   Medication Sig Dispense Refill    amitriptyline (ELAVIL) 25 mg tablet Take 25 mg by mouth      buPROPion (WELLBUTRIN XL) 300 mg 24 hr tablet Take 1 tablet (300 mg total) by mouth daily 90 tablet 2    calcium-vitamin D (OSCAL) 250-125 MG-UNIT per tablet Take 3 tablets by mouth daily      cyclobenzaprine (FLEXERIL) 5 mg tablet Take 5 mg by mouth daily at bedtime      dicyclomine (BENTYL) 10 mg capsule Take 10 mg by mouth 4 (four) times a day (before meals and at bedtime)      estrogens, conjugated,-methyltestosterone (ESTRATEST HS) 0 625-1 25 MG per tablet Take one every other day 45 tablet 2    metaxalone (SKELAXIN) 800 mg tablet Take 800 mg by mouth Three times daily as needed      methylprednisolone (MEDROL) 4 mg tablet Take 4 mg by mouth daily      Milnacipran HCl (SAVELLA) 50 MG TABS Take by mouth 2 (two) times a day      montelukast (SINGULAIR) 10 mg tablet Take 1 tablet (10 mg total) by mouth daily at bedtime 90 tablet 2    simethicone (MYLICON) 993 MG chewable tablet Chew 1 tablet (125 mg total) every 8 (eight) hours 30 tablet 0    traMADol (ULTRAM) 50 mg tablet Take 1 tablet (50 mg total) by mouth every 12 (twelve) hours 30 tablet 0    lactulose (CHRONULAC) 10 g/15 mL solution TAKE 15 ML (10 G TOTAL) BY MOUTH DAILY (Patient not taking: Reported on 1/19/2021) 300 mL 0    levocetirizine (XYZAL) 5 MG tablet Take 5 mg by mouth every evening      meclizine (ANTIVERT) 12 5 MG tablet Take 1 tablet (12 5 mg total) by mouth 3 (three) times a day as needed for dizziness (Patient not taking: Reported on 1/19/2021) 30 tablet 0     No current facility-administered medications for this visit  Allergies   Allergen Reactions    Ciprofloxacin GI Intolerance    Nsaids GI Bleeding and GI Intolerance     Previous ulcer    Penicillins Hives       Review of Systems   Constitutional: Negative  HENT: Negative  Eyes: Negative  Respiratory: Negative  Cardiovascular: Negative  Gastrointestinal: Negative  Musculoskeletal: Negative  Neurological: Negative  Vitals:    01/19/21 1021   Temp: 98 °F (36 7 °C)   Weight: 93 kg (205 lb)   Height: 5' 6 7" (1 694 m)         I spent 15 minutes directly with the patient during this visit    VIRTUAL VISIT DISCLAIMER    Beni Holman acknowledges that she has consented to an online visit or consultation  She understands that the online visit is based solely on information provided by her, and that, in the absence of a face-to-face physical evaluation by the physician, the diagnosis she receives is both limited and provisional in terms of accuracy and completeness  This is not intended to replace a full medical face-to-face evaluation by the physician  Beni Holman understands and accepts these terms

## 2021-02-13 DIAGNOSIS — Z23 ENCOUNTER FOR IMMUNIZATION: ICD-10-CM

## 2021-02-15 ENCOUNTER — TELEPHONE (OUTPATIENT)
Dept: GASTROENTEROLOGY | Facility: CLINIC | Age: 66
End: 2021-02-15

## 2021-02-15 NOTE — TELEPHONE ENCOUNTER
Attempted to call pt for OA screening and discuss scheduling for colonoscopy, VM full  Will try again later

## 2021-03-11 ENCOUNTER — IMMUNIZATIONS (OUTPATIENT)
Dept: FAMILY MEDICINE CLINIC | Facility: HOSPITAL | Age: 66
End: 2021-03-11

## 2021-03-11 DIAGNOSIS — Z23 ENCOUNTER FOR IMMUNIZATION: Primary | ICD-10-CM

## 2021-03-11 PROCEDURE — 91300 SARS-COV-2 / COVID-19 MRNA VACCINE (PFIZER-BIONTECH) 30 MCG: CPT | Performed by: PHYSICIAN ASSISTANT

## 2021-03-11 PROCEDURE — 0001A SARS-COV-2 / COVID-19 MRNA VACCINE (PFIZER-BIONTECH) 30 MCG: CPT | Performed by: PHYSICIAN ASSISTANT

## 2021-04-02 ENCOUNTER — IMMUNIZATIONS (OUTPATIENT)
Dept: FAMILY MEDICINE CLINIC | Facility: HOSPITAL | Age: 66
End: 2021-04-02

## 2021-04-02 DIAGNOSIS — Z23 ENCOUNTER FOR IMMUNIZATION: Primary | ICD-10-CM

## 2021-04-02 PROCEDURE — 91300 SARS-COV-2 / COVID-19 MRNA VACCINE (PFIZER-BIONTECH) 30 MCG: CPT

## 2021-04-02 PROCEDURE — 0002A SARS-COV-2 / COVID-19 MRNA VACCINE (PFIZER-BIONTECH) 30 MCG: CPT

## 2021-05-27 DIAGNOSIS — K58.1 IRRITABLE BOWEL SYNDROME WITH CONSTIPATION: Primary | ICD-10-CM

## 2021-05-27 RX ORDER — DICYCLOMINE HYDROCHLORIDE 10 MG/1
10 CAPSULE ORAL
Qty: 120 CAPSULE | Refills: 2 | Status: SHIPPED | OUTPATIENT
Start: 2021-05-27 | End: 2021-08-19

## 2021-05-28 RX ORDER — FEXOFENADINE HCL 180 MG/1
TABLET ORAL
COMMUNITY
Start: 2020-12-01

## 2021-06-01 ENCOUNTER — TELEMEDICINE (OUTPATIENT)
Dept: INTERNAL MEDICINE CLINIC | Facility: CLINIC | Age: 66
End: 2021-06-01
Payer: MEDICARE

## 2021-06-01 VITALS — BODY MASS INDEX: 32.96 KG/M2 | WEIGHT: 210 LBS | HEIGHT: 67 IN

## 2021-06-01 DIAGNOSIS — R21 RASH: ICD-10-CM

## 2021-06-01 DIAGNOSIS — M79.7 FIBROMYALGIA: ICD-10-CM

## 2021-06-01 DIAGNOSIS — Z12.12 SCREENING FOR COLORECTAL CANCER: ICD-10-CM

## 2021-06-01 DIAGNOSIS — L71.9 ROSACEA: ICD-10-CM

## 2021-06-01 DIAGNOSIS — N95.1 SYMPTOMS, SUCH AS FLUSHING, SLEEPLESSNESS, HEADACHE, LACK OF CONCENTRATION, ASSOCIATED WITH THE MENOPAUSE: ICD-10-CM

## 2021-06-01 DIAGNOSIS — Z12.11 SCREENING FOR COLORECTAL CANCER: ICD-10-CM

## 2021-06-01 DIAGNOSIS — M79.7 FIBROMYALGIA: Primary | ICD-10-CM

## 2021-06-01 PROCEDURE — 99214 OFFICE O/P EST MOD 30 MIN: CPT | Performed by: NURSE PRACTITIONER

## 2021-06-01 PROCEDURE — 1160F RVW MEDS BY RX/DR IN RCRD: CPT | Performed by: NURSE PRACTITIONER

## 2021-06-01 PROCEDURE — 1036F TOBACCO NON-USER: CPT | Performed by: NURSE PRACTITIONER

## 2021-06-01 PROCEDURE — 3008F BODY MASS INDEX DOCD: CPT | Performed by: NURSE PRACTITIONER

## 2021-06-01 RX ORDER — ESTERIFIED ESTROGEN AND METHYLTESTOSTERONE .625; 1.25 MG/1; MG/1
TABLET ORAL
Qty: 45 TABLET | Refills: 2 | Status: SHIPPED | OUTPATIENT
Start: 2021-06-01

## 2021-06-01 RX ORDER — TRAMADOL HYDROCHLORIDE 50 MG/1
50 TABLET ORAL EVERY 12 HOURS
Qty: 30 TABLET | Refills: 0 | Status: CANCELLED | OUTPATIENT
Start: 2021-06-01

## 2021-06-01 RX ORDER — TRAMADOL HYDROCHLORIDE 50 MG/1
50 TABLET ORAL EVERY 12 HOURS
Qty: 30 TABLET | Refills: 0 | Status: SHIPPED | OUTPATIENT
Start: 2021-06-01

## 2021-06-01 NOTE — PROGRESS NOTES
Virtual Regular Visit      Assessment/Plan:    Problem List Items Addressed This Visit        Musculoskeletal and Integument    Rosacea     Refer to dermatologist         Relevant Orders    Ambulatory referral to Dermatology       Other    Fibromyalgia - Primary     Tramadol prn pain         Relevant Medications    traMADol (ULTRAM) 50 mg tablet    Symptoms, such as flushing, sleeplessness, headache, lack of concentration, associated with the menopause     Refilled estratest  Advised to follow up with gynecologist         Relevant Medications    estrogens, conjugated,-methyltestosterone (ESTRATEST HS) 0 625-1 25 MG per tablet      Other Visit Diagnoses     Screening for colorectal cancer        Relevant Orders    Ambulatory referral to Gastroenterology    Rash        Relevant Orders    Ambulatory referral to Dermatology            BMI Counseling: Body mass index is 32 89 kg/m²  The BMI is above normal  Nutrition recommendations include reducing portion sizes, decreasing overall calorie intake and reducing fast food intake  Reason for visit is   Chief Complaint   Patient presents with    Virtual Regular Visit        Encounter provider MIKAL Corey    Provider located at [address] [place] [id number]      Recent Visits  Date Type Provider Dept   06/01/21 Telemedicine Jhoan Corey recent visits within past 7 days and meeting all other requirements     Future Appointments  No visits were found meeting these conditions  Showing future appointments within next 150 days and meeting all other requirements        The patient was identified by name and date of birth  Joaquin Stauffer was informed that this is a telemedicine visit and that the visit is being conducted through [address] [place] [address] Now and patient was informed that this is a secure, HIPAA-compliant platform  She agrees to proceed     My office door was closed   No one else was in the room  She acknowledged consent and understanding of privacy and security of the video platform  The patient has agreed to participate and understands they can discontinue the visit at any time  Patient is aware this is a billable service  Subjective  Sandi Mckeon is a 72 y o  female    Patient is here for follow up of fibromyalgia  She needs a refill of tramadol, she takes this sparingly for pain    Rosacea and rash- needs a dermatologist    Hot flashes- needs a refill of estratest  She will be seeing a gynecologist       Past Medical History:   Diagnosis Date    Allergic     Arthritis     Depression     Fibromyalgia     Inflammatory bowel disease     Visual impairment        Past Surgical History:   Procedure Laterality Date    HYSTERECTOMY      REPAIR RECTOCELE         Current Outpatient Medications   Medication Sig Dispense Refill    amitriptyline (ELAVIL) 25 mg tablet Take 25 mg by mouth      buPROPion (WELLBUTRIN XL) 300 mg 24 hr tablet Take 1 tablet (300 mg total) by mouth daily 90 tablet 2    cyclobenzaprine (FLEXERIL) 5 mg tablet Take 5 mg by mouth daily at bedtime      dicyclomine (BENTYL) 10 mg capsule Take 1 capsule (10 mg total) by mouth 4 (four) times a day (before meals and at bedtime) 120 capsule 2    estrogens, conjugated,-methyltestosterone (ESTRATEST HS) 0 625-1 25 MG per tablet Take one every other day 45 tablet 2    fexofenadine (Allegra Allergy) 180 MG tablet       Milnacipran HCl (SAVELLA) 50 MG TABS Take by mouth 2 (two) times a day      traMADol (ULTRAM) 50 mg tablet Take 1 tablet (50 mg total) by mouth every 12 (twelve) hours 30 tablet 0     No current facility-administered medications for this visit           Allergies   Allergen Reactions    Ciprofloxacin GI Intolerance    Nsaids GI Bleeding and GI Intolerance     Previous ulcer    Penicillins Hives       Review of Systems    Video Exam    Vitals:    06/01/21 1531   Weight: 95 3 kg (210 lb) Height: 5' 7" (1 702 m)       Physical Exam     I spent 15 minutes directly with the patient during this visit      VIRTUAL VISIT DISCLAIMER    Tete Walker acknowledges that she has consented to an online visit or consultation  She understands that the online visit is based solely on information provided by her, and that, in the absence of a face-to-face physical evaluation by the physician, the diagnosis she receives is both limited and provisional in terms of accuracy and completeness  This is not intended to replace a full medical face-to-face evaluation by the physician  Tete Walker understands and accepts these terms

## 2021-06-04 PROBLEM — L71.9 ROSACEA: Status: ACTIVE | Noted: 2021-06-04

## 2021-08-19 DIAGNOSIS — K58.1 IRRITABLE BOWEL SYNDROME WITH CONSTIPATION: ICD-10-CM

## 2021-08-19 RX ORDER — DICYCLOMINE HYDROCHLORIDE 10 MG/1
10 CAPSULE ORAL
Qty: 360 CAPSULE | Refills: 0 | Status: SHIPPED | OUTPATIENT
Start: 2021-08-19

## 2021-11-09 ENCOUNTER — CONSULT (OUTPATIENT)
Dept: DERMATOLOGY | Facility: CLINIC | Age: 66
End: 2021-11-09
Payer: MEDICARE

## 2021-11-09 VITALS — WEIGHT: 200.8 LBS | BODY MASS INDEX: 31.45 KG/M2 | TEMPERATURE: 97.8 F

## 2021-11-09 DIAGNOSIS — L29.9 SCALP PRURITUS: ICD-10-CM

## 2021-11-09 DIAGNOSIS — L71.9 ROSACEA: ICD-10-CM

## 2021-11-09 DIAGNOSIS — L72.0 MILIUM: Primary | ICD-10-CM

## 2021-11-09 DIAGNOSIS — R21 RASH: ICD-10-CM

## 2021-11-09 PROCEDURE — 99204 OFFICE O/P NEW MOD 45 MIN: CPT | Performed by: DERMATOLOGY

## 2021-11-09 RX ORDER — LINACLOTIDE 72 UG/1
CAPSULE, GELATIN COATED ORAL
COMMUNITY
Start: 2021-09-03

## 2021-11-09 RX ORDER — DOXYCYCLINE HYCLATE 20 MG
TABLET ORAL
Qty: 30 TABLET | Refills: 2 | Status: SHIPPED | OUTPATIENT
Start: 2021-11-09 | End: 2022-02-09

## 2021-11-09 RX ORDER — METRONIDAZOLE 7.5 MG/G
GEL TOPICAL 2 TIMES DAILY
Qty: 45 G | Refills: 11 | Status: SHIPPED | OUTPATIENT
Start: 2021-11-09

## 2021-11-10 DIAGNOSIS — F33.0 MILD EPISODE OF RECURRENT MAJOR DEPRESSIVE DISORDER (HCC): ICD-10-CM

## 2021-11-10 RX ORDER — BUPROPION HYDROCHLORIDE 300 MG/1
TABLET ORAL
Qty: 90 TABLET | Refills: 2 | Status: SHIPPED | OUTPATIENT
Start: 2021-11-10

## 2022-02-22 ENCOUNTER — TELEPHONE (OUTPATIENT)
Dept: DERMATOLOGY | Facility: CLINIC | Age: 67
End: 2022-02-22

## 2022-02-22 NOTE — TELEPHONE ENCOUNTER
Called pt but no answer and lvm for a schedule change from 3/23 to 4/6 same time and office due to a provider schedule change    Advised pt to called back to confirm or reschedule appt

## 2022-03-30 DIAGNOSIS — L71.9 ROSACEA: Primary | ICD-10-CM

## 2022-03-30 RX ORDER — DOXYCYCLINE HYCLATE 20 MG
TABLET ORAL
Qty: 30 TABLET | Refills: 0 | Status: SHIPPED | OUTPATIENT
Start: 2022-03-30 | End: 2022-06-08

## 2022-04-15 ENCOUNTER — TELEPHONE (OUTPATIENT)
Dept: INTERNAL MEDICINE CLINIC | Facility: CLINIC | Age: 67
End: 2022-04-15

## 2022-05-13 ENCOUNTER — TELEPHONE (OUTPATIENT)
Dept: OTHER | Facility: OTHER | Age: 67
End: 2022-05-13

## 2022-05-13 NOTE — TELEPHONE ENCOUNTER
Patient would like a referral to an ENT for a sinus infection  She said that she has had this for a week  Or she would like to to be seen in the office as soon as possible  Please call her back as I see she is due for her AMW also

## 2022-05-13 NOTE — TELEPHONE ENCOUNTER
Patient called in to report her home Covid test is negative  Appointment confirmation for Monday 5/16 at 11:15 AM  Appointment is scheduled as virtual  Patient not able to schedule with ENT provider in near future  Please follow up with patient

## 2022-06-03 DIAGNOSIS — L71.9 ROSACEA: ICD-10-CM

## 2022-06-06 DIAGNOSIS — L71.9 ROSACEA: ICD-10-CM

## 2022-06-08 RX ORDER — DOXYCYCLINE HYCLATE 20 MG
TABLET ORAL
Qty: 30 TABLET | Refills: 0 | Status: SHIPPED | OUTPATIENT
Start: 2022-06-08 | End: 2022-07-08

## 2022-06-27 RX ORDER — DOXYCYCLINE HYCLATE 20 MG
TABLET ORAL
Qty: 30 TABLET | Refills: 0 | OUTPATIENT
Start: 2022-06-27 | End: 2022-07-27

## 2022-07-25 ENCOUNTER — NURSE TRIAGE (OUTPATIENT)
Dept: OTHER | Facility: OTHER | Age: 67
End: 2022-07-25

## 2022-07-25 NOTE — TELEPHONE ENCOUNTER
Summary: covid +/slpg/symptomatic - pain in lungs now - concerned     Started with symptoms on Thursday, exposed a week ago, tested positive today for covid, Sore throat is gone but it has gone into my trachea into my lung  I am a little nervous about the fact that there is pain, in my lung, like a burning sensation   I feel better but am interested in paxlovid"

## 2022-07-25 NOTE — TELEPHONE ENCOUNTER
Reason for Disposition   [1] COVID-19 diagnosed by positive lab test (e g , PCR, rapid self-test kit) AND [2] mild symptoms (e g , cough, fever, others) AND [7] no complications or SOB    Answer Assessment - Initial Assessment Questions  1  COVID-19 DIAGNOSIS: "Who made your COVID-19 diagnosis?" "Was it confirmed by a positive lab test or self-test?" If not diagnosed by a doctor (or NP/PA), ask "Are there lots of cases (community spread) where you live?" Note: See [facility] health department website, if unsure  *No Answer*  2  COVID-19 EXPOSURE: "Was there any known exposure to COVID before the symptoms began?" CDC Definition of close contact: within 6 feet (2 meters) for a total of 15 minutes or more over a 24-hour period  *No Answer*  3  ONSET: "When did the COVID-19 symptoms start?"       *No Answer*  4  WORST SYMPTOM: "What is your worst symptom?" (e g , cough, fever, shortness of breath, muscle aches)      Cough -possible post nasal drip   periodically  5  COUGH: "Do you have a cough?" If Yes, ask: "How bad is the cough?"        *No Answer*  6  FEVER: "Do you have a fever?" If Yes, ask: "What is your temperature, how was it measured, and when did it start?"      Denies   7  RESPIRATORY STATUS: "Describe your breathing?" (e g , shortness of breath, wheezing, unable to speak)       denies  8  BETTER-SAME-WORSE: "Are you getting better, staying the same or getting worse compared to yesterday?"  If getting worse, ask, "In what way?"      Worse yesterday with burning  9  HIGH RISK DISEASE: "Do you have any chronic medical problems?" (e g , asthma, heart or lung disease, weak immune system, obesity, etc )      Denies   10  VACCINE: "Have you had the COVID-19 vaccine?" If Yes, ask: "Which one, how many shots, when did you get it?"        Vaccinated and both booster  11  BOOSTER: "Have you received your COVID-19 booster?" If Yes, ask: "Which one and when did you get it?"        pfizar    13   OTHER SYMPTOMS: "Do you have any other symptoms?"  (e g , chills, fatigue, headache, loss of smell or taste, muscle pain, sore throat)        Denies   14   O2 SATURATION MONITOR:  "Do you use an oxygen saturation monitor (pulse oximeter) at home?" If Yes, ask "What is your reading (oxygen level) today?" "What is your usual oxygen saturation reading?" (e g , 95%)        Denies   Sore throat-   Contact with positve- sister  Denies sob   Burning in the lungs  Tylenol   Denies hurt to swallow  Not constant -    Protocols used: CORONAVIRUS (COVID-19) DIAGNOSED OR SUSPECTED-ADULT-[place]

## 2022-07-25 NOTE — TELEPHONE ENCOUNTER
Pt called in stating she was exposed last week by sister  Positive for covid via home test  States symptoms started on Thursday  Sore throat worse today   Notice burning feeling moving down to lungs  Denies any trouble swallowing or breathing  Taking tylenol   Denies fever or any other symptoms  Interested in [facility]   Virtual appt scheduled for 7/26/2022 with Dr Jeremiah Christianson

## 2022-11-17 DIAGNOSIS — F33.0 MILD EPISODE OF RECURRENT MAJOR DEPRESSIVE DISORDER (HCC): ICD-10-CM

## 2022-11-17 RX ORDER — BUPROPION HYDROCHLORIDE 300 MG/1
300 TABLET ORAL DAILY
Qty: 90 TABLET | Refills: 0 | Status: SHIPPED | OUTPATIENT
Start: 2022-11-17

## 2022-11-29 ENCOUNTER — OFFICE VISIT (OUTPATIENT)
Dept: INTERNAL MEDICINE CLINIC | Facility: CLINIC | Age: 67
End: 2022-11-29

## 2022-11-29 VITALS
WEIGHT: 187.6 LBS | HEIGHT: 67 IN | SYSTOLIC BLOOD PRESSURE: 126 MMHG | BODY MASS INDEX: 29.44 KG/M2 | HEART RATE: 80 BPM | DIASTOLIC BLOOD PRESSURE: 72 MMHG | OXYGEN SATURATION: 96 %

## 2022-11-29 DIAGNOSIS — M79.7 FIBROMYALGIA: ICD-10-CM

## 2022-11-29 DIAGNOSIS — F33.0 MILD EPISODE OF RECURRENT MAJOR DEPRESSIVE DISORDER (HCC): ICD-10-CM

## 2022-11-29 DIAGNOSIS — Z12.31 ENCOUNTER FOR SCREENING MAMMOGRAM FOR BREAST CANCER: ICD-10-CM

## 2022-11-29 DIAGNOSIS — Z13.220 LIPID SCREENING: ICD-10-CM

## 2022-11-29 DIAGNOSIS — M62.838 MUSCLE SPASM: ICD-10-CM

## 2022-11-29 DIAGNOSIS — M46.1 SACROILIITIS (HCC): ICD-10-CM

## 2022-11-29 DIAGNOSIS — F11.20 CONTINUOUS OPIOID DEPENDENCE (HCC): ICD-10-CM

## 2022-11-29 DIAGNOSIS — Z78.0 POST-MENOPAUSAL: ICD-10-CM

## 2022-11-29 DIAGNOSIS — Z13.29 SCREENING FOR THYROID DISORDER: ICD-10-CM

## 2022-11-29 DIAGNOSIS — Z79.899 HIGH RISK MEDICATION USE: ICD-10-CM

## 2022-11-29 DIAGNOSIS — Z13.1 SCREENING FOR DIABETES MELLITUS: ICD-10-CM

## 2022-11-29 DIAGNOSIS — Z13.820 SCREENING FOR OSTEOPOROSIS: ICD-10-CM

## 2022-11-29 DIAGNOSIS — Z13.0 SCREENING, DEFICIENCY ANEMIA, IRON: ICD-10-CM

## 2022-11-29 DIAGNOSIS — Z23 ENCOUNTER FOR IMMUNIZATION: ICD-10-CM

## 2022-11-29 DIAGNOSIS — M15.9 PRIMARY OSTEOARTHRITIS INVOLVING MULTIPLE JOINTS: ICD-10-CM

## 2022-11-29 DIAGNOSIS — Z00.00 MEDICARE ANNUAL WELLNESS VISIT, SUBSEQUENT: Primary | ICD-10-CM

## 2022-11-29 RX ORDER — GABAPENTIN 300 MG/1
300 CAPSULE ORAL
COMMUNITY
Start: 2022-11-21 | End: 2023-11-21

## 2022-11-29 RX ORDER — TRAMADOL HYDROCHLORIDE 50 MG/1
50 TABLET ORAL EVERY 12 HOURS
Qty: 30 TABLET | Refills: 0 | Status: SHIPPED | OUTPATIENT
Start: 2022-11-29

## 2022-11-29 RX ORDER — LINACLOTIDE 290 UG/1
CAPSULE, GELATIN COATED ORAL
COMMUNITY
Start: 2022-11-29

## 2022-11-29 RX ORDER — BUPROPION HYDROCHLORIDE 300 MG/1
300 TABLET ORAL DAILY
Qty: 90 TABLET | Refills: 2 | Status: SHIPPED | OUTPATIENT
Start: 2022-11-29

## 2022-11-29 RX ORDER — CYCLOBENZAPRINE HCL 5 MG
5 TABLET ORAL
Qty: 30 TABLET | Refills: 5 | Status: SHIPPED | OUTPATIENT
Start: 2022-11-29

## 2022-11-29 NOTE — PATIENT INSTRUCTIONS
Medicare Preventive Visit Patient Instructions  Thank you for completing your Welcome to Medicare Visit or Medicare Annual Wellness Visit today  Your next wellness visit will be due in one year (11/30/2023)  The screening/preventive services that you may require over the next 5-10 years are detailed below  Some tests may not apply to you based off risk factors and/or age  Screening tests ordered at today's visit but not completed yet may show as past due  Also, please note that scanned in results may not display below  Preventive Screenings:  Service Recommendations Previous Testing/Comments   Colorectal Cancer Screening  * Colonoscopy    * Fecal Occult Blood Test (FOBT)/Fecal Immunochemical Test (FIT)  * Fecal DNA/Cologuard Test  * Flexible Sigmoidoscopy Age: 39-70 years old   Colonoscopy: every 10 years (may be performed more frequently if at higher risk)  OR  FOBT/FIT: every 1 year  OR  Cologuard: every 3 years  OR  Sigmoidoscopy: every 5 years  Screening may be recommended earlier than age 39 if at higher risk for colorectal cancer  Also, an individualized decision between you and your healthcare provider will decide whether screening between the ages of 74-80 would be appropriate  Colonoscopy: 11/24/2021  FOBT/FIT: Not on file  Cologuard: Not on file  Sigmoidoscopy: Not on file          Breast Cancer Screening Age: 36 years old  Frequency: every 1-2 years  Not required if history of left and right mastectomy Mammogram: Not on file        Cervical Cancer Screening Between the ages of 21-29, pap smear recommended once every 3 years  Between the ages of 33-67, can perform pap smear with HPV co-testing every 5 years     Recommendations may differ for women with a history of total hysterectomy, cervical cancer, or abnormal pap smears in past  Pap Smear: Not on file        Hepatitis C Screening Once for adults [facility]  More frequently in patients at high risk for Hepatitis C Hep C Antibody: Not on file        Diabetes Screening 1-2 times per year if you're at risk for diabetes or have pre-diabetes Fasting glucose: No results in last 5 years (No results in last 5 years)  A1C: No results in last 5 years (No results in last 5 years)      Cholesterol Screening Once every 5 years if you don't have a lipid disorder  May order more often based on risk factors  Lipid panel: Not on file          Other Preventive Screenings Covered by Medicare:  1  Abdominal Aortic Aneurysm (AAA) Screening: covered once if your at risk  You're considered to be at risk if you have a family history of AAA  2  Lung Cancer Screening: covers low dose CT scan once per year if you meet all of the following conditions: (1) Age 50-69; (2) No signs or symptoms of lung cancer; (3) Current smoker or have quit smoking within the last 15 years; (4) You have a tobacco smoking history of at least 20 pack years (packs per day multiplied by number of years you smoked); (5) You get a written order from a healthcare provider  3  Glaucoma Screening: covered annually if you're considered high risk: (1) You have diabetes OR (2) Family history of glaucoma OR (3)  aged 48 and older OR (3)  American aged 72 and older  3  Osteoporosis Screening: covered every 2 years if you meet one of the following conditions: (1) You're estrogen deficient and at risk for osteoporosis based off medical history and other findings; (2) Have a vertebral abnormality; (3) On glucocorticoid therapy for more than 3 months; (4) Have primary hyperparathyroidism; (5) On osteoporosis medications and need to assess response to drug therapy  · Last bone density test (DXA Scan): Not on file  5  HIV Screening: covered annually if you're between the age of 12-76  Also covered annually if you are younger than 13 and older than 72 with risk factors for HIV infection   For pregnant patients, it is covered up to 3 times per pregnancy  Immunizations:  Immunization Recommendations   Influenza Vaccine Annual influenza vaccination during flu season is recommended for all persons aged >= 6 months who do not have contraindications   Pneumococcal Vaccine   * Pneumococcal conjugate vaccine = PCV13 (Prevnar 13), PCV15 (Vaxneuvance), PCV20 (Prevnar 20)  * Pneumococcal polysaccharide vaccine = PPSV23 (Pneumovax) Adults 25-60 years old: 1-3 doses may be recommended based on certain risk factors  Adults 72 years old: 1-2 doses may be recommended based off what pneumonia vaccine you previously received   Hepatitis B Vaccine 3 dose series if at intermediate or high risk (ex: diabetes, end stage renal disease, liver disease)   Tetanus (Td) Vaccine - COST NOT COVERED BY MEDICARE PART B Following completion of primary series, a booster dose should be given every 10 years to maintain immunity against tetanus  Td may also be given as tetanus wound prophylaxis  Tdap Vaccine - COST NOT COVERED BY MEDICARE PART B Recommended at least once for all adults  For pregnant patients, recommended with each pregnancy  Shingles Vaccine (Shingrix) - COST NOT COVERED BY MEDICARE PART B  2 shot series recommended in those aged 48 and above     Health Maintenance Due:      Topic Date Due   • Hepatitis C Screening  Never done   • Breast Cancer Screening: Mammogram  Never done   • Colorectal Cancer Screening  11/24/2022     Immunizations Due:      Topic Date Due   • Hepatitis B Vaccine (1 of 3 - 3-dose series) Never done   • Pneumococcal Vaccine: 65+ Years (1 - PCV) Never done   • Influenza Vaccine (1) Never done     Advance Directives   What are advance directives? Advance directives are legal documents that state your wishes and plans for medical care  These plans are made ahead of time in case you lose your ability to make decisions for yourself   Advance directives can apply to any medical decision, such as the treatments you want, and if you want to donate organs  What are the types of advance directives? There are many types of advance directives, and each state has rules about how to use them  You may choose a combination of any of the following:  · Living will: This is a written record of the treatment you want  You can also choose which treatments you do not want, which to limit, and which to stop at a certain time  This includes surgery, medicine, IV fluid, and tube feedings  · Durable power of  for healthcare [facility]): This is a written record that states who you want to make healthcare choices for you when you are unable to make them for yourself  This person, called a proxy, is usually a family member or a friend  You may choose more than 1 proxy  · Do not resuscitate (DNR) order:  A DNR order is used in case your heart stops beating or you stop breathing  It is a request not to have certain forms of treatment, such as CPR  A DNR order may be included in other types of advance directives  · Medical directive: This covers the care that you want if you are in a coma, near death, or unable to make decisions for yourself  You can list the treatments you want for each condition  Treatment may include pain medicine, surgery, blood transfusions, dialysis, IV or tube feedings, and a ventilator (breathing machine)  · Values history: This document has questions about your views, beliefs, and how you feel and think about life  This information can help others choose the care that you would choose  Why are advance directives important? An advance directive helps you control your care  Although spoken wishes may be used, it is better to have your wishes written down  Spoken wishes can be misunderstood, or not followed  Treatments may be given even if you do not want them  An advance directive may make it easier for your family to make difficult choices about your care         © Copyright [employer] 2018 Information is for End User's use only and may not be sold, redistributed or otherwise used for commercial purposes   All illustrations and images included in CareNotes® are the copyrighted property of A D A M , Inc  or [facility] [address]

## 2022-11-29 NOTE — PROGRESS NOTES
Assessment and Plan:     Problem List Items Addressed This Visit        Musculoskeletal and Integument    Sacroiliitis (HCC)       Other    Fibromyalgia    Relevant Medications    traMADol (ULTRAM) 50 mg tablet    Depression    Relevant Medications    buPROPion (WELLBUTRIN XL) 300 mg 24 hr tablet    Continuous opioid dependence (HCC)   Other Visit Diagnoses     Medicare annual wellness visit, subsequent    -  Primary    Encounter for screening mammogram for breast cancer        Relevant Orders    Mammo screening bilateral w 3d & cad    Encounter for immunization        Post-menopausal        Relevant Orders    DXA bone density spine hip and pelvis    Screening for osteoporosis        Relevant Orders    DXA bone density spine hip and pelvis    High risk medication use        Relevant Orders    TSH, 3rd generation with Free T4 reflex    CBC and differential    Comprehensive metabolic panel    Muscle spasm        Relevant Medications    cyclobenzaprine (FLEXERIL) 5 mg tablet    Lipid screening        Relevant Orders    Lipid panel    Screening, deficiency anemia, iron        Relevant Orders    CBC and differential    Screening for thyroid disorder        Relevant Orders    TSH, 3rd generation with Free T4 reflex    Screening for diabetes mellitus        Relevant Orders    Comprehensive metabolic panel    Primary osteoarthritis involving multiple joints        Relevant Medications    traMADol (ULTRAM) 50 mg tablet      Patient clinically stable at this time  Cont with current plan of care  RTO as recommended and PRN    Given that the patient reports overall reduced pain and improved level functioning without significant side effects, I felt a reasonable to continue the patient on current agent and dosing schedule as needed for pain  Continue current treatment plan  Reviewed   Review Appropriate  Patient is not receiving medications from other  Providers  No physical or psychological signs of dependence  Patient compliant with our agreement  Again discussed the risks of developing physical/psychological dependence of the controlled substance  Discussed risks, dangers of addiction overdose  Discussed dangers of concurrent use of pain medications with alcohol, benzos and other depressants  Side effects include but are not limited to nausea, vomiting, GI intolerance, sedation, constipation, mental clouding, opioid-induced hyperalgesia, endocrine dysfunction, addiction, dependence, and tolerance  The patient was asked to take his medications only as prescribed and directed, never in excess, and never for any other reason other than for pain control  The patient was also asked to keep his medications out of the reach of others and away from children, preferably in a locked drawer  The patient verbalized understanding and wished to use these opioid medications  Patient is advised that he/she must comply with prescribed treatment plans related to current  disorder including counseling, follow-up, use of adjunctive non pharmacologic approaches  If he/she does not comply with prescribed treatment plans or violate controlled substance agreement he may be subject to dismissal from this practice  He/ she verbalizes understanding and agreement of the above  Preventive health issues were discussed with patient, and age appropriate screening tests were ordered as noted in patient's After Visit Summary  Personalized health advice and appropriate referrals for health education or preventive services given if needed, as noted in patient's After Visit Summary  History of Present Illness:     Patient presents for a Medicare Wellness Visit    HPI   Patient Care Team:  Zeinab Hector as PCP - General (Family Medicine)     Review of Systems:     Review of Systems   Constitutional: Negative for fever  Respiratory: Negative for shortness of breath  Cardiovascular: Negative for chest pain     Gastrointestinal: Ongoing IBS symptoms  Due for colonoscopy   Musculoskeletal: Positive for arthralgias and back pain          Severe knee pain needs tkr  Takes very occasional tramadol for pain        Problem List:     Patient Active Problem List   Diagnosis   • Fibromyalgia   • Depression   • Tick bite   • Hair loss   • Symptoms, such as flushing, sleeplessness, headache, lack of concentration, associated with the menopause   • Vertigo   • Chronic maxillary sinusitis   • Other constipation   • Rosacea   • Sacroiliitis (HCC)   • Continuous opioid dependence (HCC)      Past Medical and Surgical History:     Past Medical History:   Diagnosis Date   • Allergic    • Arthritis    • Depression    • Fibromyalgia    • Inflammatory bowel disease    • Visual impairment      Past Surgical History:   Procedure Laterality Date   • HYSTERECTOMY     • REPAIR RECTOCELE        Family History:     Family History   Problem Relation Age of Onset   • Cancer Mother    • Dementia Father    • Heart disease Father    • Arthritis Father    • Skin cancer Father    • Skin cancer Sister    • Alcohol abuse Brother       Social History:     Social History     Socioeconomic History   • Marital status: /Civil Union     Spouse name: None   • Number of children: None   • Years of education: None   • Highest education level: None   Occupational History   • None   Tobacco Use   • Smoking status: Former     Packs/day: 1 00     Years: 10 00     Pack years: 10 00     Types: Cigarettes     Start date: 1971     Quit date:      Years since quittin 9   • Smokeless tobacco: Never   Vaping Use   • Vaping Use: Never used   Substance and Sexual Activity   • Alcohol use: Yes     Comment: couple times per year   • Drug use: Never   • Sexual activity: Yes   Other Topics Concern   • None   Social History Narrative   • None     Social Determinants of Health     Financial Resource Strain: Low Risk    • Difficulty of Paying Living Expenses: Not hard at all   Food Insecurity: Not on file   Transportation Needs: No Transportation Needs   • Lack of Transportation (Medical): No   • Lack of Transportation (Non-Medical): No   Physical Activity: Not on file   Stress: Not on file   Social Connections: Not on file   Intimate Partner Violence: Not on file   Housing Stability: Not on file      Medications and Allergies:     Current Outpatient Medications   Medication Sig Dispense Refill   • buPROPion (WELLBUTRIN XL) 300 mg 24 hr tablet Take 1 tablet (300 mg total) by mouth daily 90 tablet 2   • cyclobenzaprine (FLEXERIL) 5 mg tablet Take 1 tablet (5 mg total) by mouth daily at bedtime as needed for muscle spasms 30 tablet 5   • estrogens, conjugated,-methyltestosterone (ESTRATEST HS) 0 625-1 25 MG per tablet Take one every other day 45 tablet 2   • fexofenadine (ALLEGRA) 180 MG tablet      • gabapentin (NEURONTIN) 300 mg capsule Take 300 mg by mouth     • metroNIDAZOLE (METROGEL) 0 75 % gel Apply topically 2 (two) times a day 45 g 11   • Milnacipran HCl 50 MG TABS Take by mouth 2 (two) times a day     • mupirocin (BACTROBAN) 2 % ointment      • traMADol (ULTRAM) 50 mg tablet Take 1 tablet (50 mg total) by mouth every 12 (twelve) hours 30 tablet 0   • amitriptyline (ELAVIL) 25 mg tablet Take 25 mg by mouth (Patient not taking: Reported on 11/9/2021)     • dicyclomine (BENTYL) 10 mg capsule TAKE 1 CAPSULE (10 MG TOTAL) BY MOUTH 4 (FOUR) TIMES A DAY (BEFORE MEALS AND AT BEDTIME) (Patient not taking: Reported on 11/9/2021) 360 capsule 0   • Linzess 290 MCG CAPS        No current facility-administered medications for this visit       Allergies   Allergen Reactions   • Ciprofloxacin GI Intolerance   • Nsaids GI Bleeding and GI Intolerance     Previous ulcer   • Penicillins Hives      Immunizations:     Immunization History   Administered Date(s) Administered   • COVID-19 PFIZER VACCINE 0 3 ML IM 03/11/2021, 04/02/2021, 09/22/2021   • COVID-19 Pfizer vac (Gary-sucrose, gray cap) 12 yr+ IM 04/29/2022      Health Maintenance:         Topic Date Due   • Breast Cancer Screening: Mammogram  Never done   • Colorectal Cancer Screening  11/24/2022   • Hepatitis C Screening  12/29/2024 (Originally 1955)         Topic Date Due   • Influenza Vaccine (1) Never done      Medicare Screening Tests and Risk Assessments:     Rommel Joyner is here for her Subsequent Wellness visit  Last Medicare Wellness visit information reviewed, patient interviewed and updates made to the record today  Health Risk Assessment:   Patient rates overall health as very good  Patient feels that their physical health rating is much worse  Patient is dissatisfied with their life  Eyesight was rated as slightly worse  Hearing was rated as same  Patient feels that their emotional and mental health rating is same  Patients states they are never, rarely angry  Patient states they are often unusually tired/fatigued  Pain experienced in the last 7 days has been some  Patient's pain rating has been 4/10  Patient states that she has experienced no weight loss or gain in last 6 months  Depression Screening:   PHQ-9 Score: 4      Fall Risk Screening: In the past year, patient has experienced: no history of falling in past year      Urinary Incontinence Screening:   Patient has leaked urine accidently in the last six months  Home Safety:  Patient has trouble with stairs inside or outside of their home  Patient has working smoke alarms and has working carbon monoxide detector  Home safety hazards include: none  Nutrition:   Current diet is Low Saturated Fat  Weight watchers    Medications:   Patient is currently taking over-the-counter supplements  OTC medications include: see medication list  Patient is able to manage medications       Activities of Daily Living (ADLs)/Instrumental Activities of Daily Living (IADLs):   Walk and transfer into and out of bed and chair?: Yes  Dress and groom yourself?: Yes    Bathe or shower yourself?: Yes    Feed yourself? Yes  Do your laundry/housekeeping?: Yes  Manage your money, pay your bills and track your expenses?: Yes  Make your own meals?: Yes    Do your own shopping?: Yes    Previous Hospitalizations:   Any hospitalizations or ED visits within the last 12 months?: No      Advance Care Planning:   Living will: No    Durable POA for healthcare: No    Advanced directive: No    Advanced directive counseling given: Yes    Five wishes given: Yes    Patient declined ACP directive: No      Cognitive Screening:   Provider or family/friend/caregiver concerned regarding cognition?: No    PREVENTIVE SCREENINGS      Cardiovascular Screening:    General: Risks and Benefits Discussed    Due for: Lipid Panel      Diabetes Screening:     General: Risks and Benefits Discussed    Due for: Blood Glucose      Colorectal Cancer Screening:     General: Screening Current      Breast Cancer Screening:     General: Risks and Benefits Discussed    Due for: Mammogram        Cervical Cancer Screening:    General: Screening Not Indicated      Osteoporosis Screening:    General: Risks and Benefits Discussed    Due for: DXA Axial      Abdominal Aortic Aneurysm (AAA) Screening:        General: Screening Not Indicated      Lung Cancer Screening:     General: Screening Not Indicated      Hepatitis C Screening:    General: Screening Current    Screening, Brief Intervention, and Referral to Treatment (SBIRT)    Screening  Typical number of drinks in a day: 0  Typical number of drinks in a week: 0  Interpretation: Low risk drinking behavior  Single Item Drug Screening:  How often have you used an illegal drug (including marijuana) or a prescription medication for non-medical reasons in the past year? never    Single Item Drug Screen Score: 0  Interpretation: Negative screen for possible drug use disorder    Brief Intervention  Alcohol & drug use screenings were reviewed  No concerns regarding substance use disorder identified       Review of Current Opioid Use    Opioid Risk Tool (ORT) Interpretation: Complete Opioid Risk Tool (ORT)    No results found  Physical Exam:     /72   Pulse 80   Wt 85 1 kg (187 lb 9 6 oz)   SpO2 96%   BMI 29 38 kg/m²     Physical Exam  Vitals and nursing note reviewed  Constitutional:       General: She is not in acute distress  Appearance: Normal appearance  Neck:      Vascular: No carotid bruit  Cardiovascular:      Rate and Rhythm: Normal rate and regular rhythm  Pulses: Normal pulses  Heart sounds: Normal heart sounds  Pulmonary:      Effort: Pulmonary effort is normal       Breath sounds: Normal breath sounds  Musculoskeletal:      Right lower leg: No edema  Left lower leg: No edema  Lymphadenopathy:      Cervical: No cervical adenopathy  Skin:     Coloration: Skin is not pale  Neurological:      General: No focal deficit present  Mental Status: She is alert     Psychiatric:         Mood and Affect: Mood normal          Behavior: Behavior normal           MIKAL Chauhan

## 2022-12-06 ENCOUNTER — TELEMEDICINE (OUTPATIENT)
Dept: INTERNAL MEDICINE CLINIC | Facility: CLINIC | Age: 67
End: 2022-12-06

## 2022-12-06 DIAGNOSIS — J01.41 ACUTE RECURRENT PANSINUSITIS: Primary | ICD-10-CM

## 2022-12-06 RX ORDER — AZITHROMYCIN 250 MG/1
TABLET, FILM COATED ORAL
Qty: 6 TABLET | Refills: 0 | Status: SHIPPED | OUTPATIENT
Start: 2022-12-06 | End: 2022-12-10

## 2022-12-06 NOTE — PROGRESS NOTES
Virtual Regular Visit    Verification of patient location:    Patient is located in the following state in which I hold an active license [place]      Assessment/Plan:    Problem List Items Addressed This Visit    None  Visit Diagnoses     Acute recurrent pansinusitis    -  Primary    Relevant Medications    azithromycin (ZITHROMAX) 250 mg tablet      The case discussed with patient using patient centered shared decision making  The patient was counseled regarding instructions for management,-- risk factor reductions,-- prognosis,-- impressions,-- risks and benefits of treatment options,-- importance of compliance with treatment  I have reviewed the instructions with the patient, answering all questions to her satisfaction  Supportive care per discussion  Cont sinus hygeine per ent  Call if not better 1 week         Reason for visit is   Chief Complaint   Patient presents with   • Virtual Regular Visit     Symptoms  for one week - productive cough- green mucous, body aches and fatigue, sore throat    • Virtual Regular Visit        Encounter provider MIKAL Lopez    Provider located at [address] [place] [id number]      Recent Visits  Date Type Provider Dept   11/29/22 Office Visit Jhoan Lopez recent visits within past 7 days and meeting all other requirements  Today's Visits  Date Type Provider Dept   12/06/22 Telemedicine Jhoan Lopez today's visits and meeting all other requirements  Future Appointments  No visits were found meeting these conditions  Showing future appointments within next 150 days and meeting all other requirements       The patient was identified by name and date of birth  Nikki Caseyosta was informed that this is a telemedicine visit and that the visit is being conducted through the [address] [place] [address] Now platform  She agrees to proceed     My office door was closed  No one else was in the room  She acknowledged consent and understanding of privacy and security of the video platform  The patient has agreed to participate and understands they can discontinue the visit at any time  Patient is aware this is a billable service  Subjective        Sinusitis  This is a recurrent (patient has h/o recurrent and sometimes refractory sinus infections  s/p sinuplasty) problem  The current episode started 1 to 4 weeks ago  The problem has been gradually worsening since onset  Maximum temperature: low grade fever  Associated symptoms include congestion, coughing and sinus pressure  Pertinent negatives include no ear pain, shortness of breath, sore throat or swollen glands  (Purulent nasal discharge  ) Treatments tried: sprays, otc sinus remedies  The treatment provided mild relief          Past Medical History:   Diagnosis Date   • Allergic    • Arthritis    • Depression    • Fibromyalgia    • Inflammatory bowel disease    • Visual impairment        Past Surgical History:   Procedure Laterality Date   • HYSTERECTOMY     • REPAIR RECTOCELE         Current Outpatient Medications   Medication Sig Dispense Refill   • azithromycin (ZITHROMAX) 250 mg tablet Take 2 tablets today then 1 tablet daily x 4 days 6 tablet 0   • buPROPion (WELLBUTRIN XL) 300 mg 24 hr tablet Take 1 tablet (300 mg total) by mouth daily 90 tablet 2   • cyclobenzaprine (FLEXERIL) 5 mg tablet Take 1 tablet (5 mg total) by mouth daily at bedtime as needed for muscle spasms 30 tablet 5   • estrogens, conjugated,-methyltestosterone (ESTRATEST HS) 0 625-1 25 MG per tablet Take one every other day 45 tablet 2   • fexofenadine (ALLEGRA) 180 MG tablet      • gabapentin (NEURONTIN) 300 mg capsule Take 300 mg by mouth     • Linzess 290 MCG CAPS      • metroNIDAZOLE (METROGEL) 0 75 % gel Apply topically 2 (two) times a day 45 g 11   • Milnacipran HCl 50 MG TABS Take by mouth 2 (two) times a day     • mupirocin (BACTROBAN) 2 % ointment      • traMADol (ULTRAM) 50 mg tablet Take 1 tablet (50 mg total) by mouth every 12 (twelve) hours 30 tablet 0     No current facility-administered medications for this visit  Allergies   Allergen Reactions   • Ciprofloxacin GI Intolerance   • Nsaids GI Bleeding and GI Intolerance     Previous ulcer   • Penicillins Hives       Review of Systems   Constitutional: Positive for fatigue and fever  HENT: Positive for congestion, postnasal drip and sinus pressure  Negative for ear pain and sore throat  Respiratory: Positive for cough  Negative for shortness of breath  Cardiovascular: Negative for chest pain  Gastrointestinal: Negative  Neurological: Negative  Video Exam    There were no vitals filed for this visit  Physical Exam  Constitutional:       General: She is not in acute distress  Appearance: Normal appearance  She is not ill-appearing  Neurological:      Mental Status: She is alert            I spent 10 minutes directly with the patient during this visit

## 2023-01-09 ENCOUNTER — OFFICE VISIT (OUTPATIENT)
Dept: INTERNAL MEDICINE CLINIC | Facility: CLINIC | Age: 68
End: 2023-01-09

## 2023-01-09 VITALS
BODY MASS INDEX: 28.63 KG/M2 | WEIGHT: 182.4 LBS | HEIGHT: 67 IN | DIASTOLIC BLOOD PRESSURE: 78 MMHG | SYSTOLIC BLOOD PRESSURE: 122 MMHG

## 2023-01-09 DIAGNOSIS — M46.1 SACROILIITIS (HCC): ICD-10-CM

## 2023-01-09 DIAGNOSIS — F11.20 CONTINUOUS OPIOID DEPENDENCE (HCC): ICD-10-CM

## 2023-01-09 DIAGNOSIS — R23.8 VESICULAR RASH: Primary | ICD-10-CM

## 2023-01-09 RX ORDER — VALACYCLOVIR HYDROCHLORIDE 1 G/1
1000 TABLET, FILM COATED ORAL 3 TIMES DAILY
Qty: 21 TABLET | Refills: 0 | Status: SHIPPED | OUTPATIENT
Start: 2023-01-09 | End: 2023-01-16

## 2023-01-09 NOTE — PROGRESS NOTES
Assessment/Plan     shingles vs herpetic rash  The case discussed with patient using patient centered shared decision making  The patient was counseled regarding instructions for management,-- risk factor reductions,-- prognosis,-- impressions,-- risks and benefits of treatment options,-- importance of compliance with treatment  I have reviewed the instructions with the patient, answering all questions to her satisfaction  Medications: valtrex  Verbal patient instruction given  Follow up in 1 week        Subjective     Gregorio King is a 79 y o  female who presents for evaluation of a rash involving the forearm  Rash started 2 days ago  Lesions are red, and blistering in texture  Rash has not changed over time  Rash itching and painful (cactus pricks)  Associated symptoms: felt fatigued, achy last week  Patient denies: abdominal pain, cough, fever, headache and vomiting  Patient has not had contacts with similar rash  Patient has not had new exposures (soaps, lotions, laundry detergents, foods, medications, plants, insects or animals)  The following portions of the patient's history were reviewed and updated as appropriate: allergies, current medications, past family history, past medical history, past social history, past surgical history and problem list     Review of Systems  Pertinent items are noted in HPI       Objective     /78   Ht 5' 7" (1 702 m)   Wt 82 7 kg (182 lb 6 4 oz)   BMI 28 57 kg/m²   General:  alert and oriented, in no acute distress   Skin:  per image

## 2023-02-24 DIAGNOSIS — R23.8 VESICULAR RASH: ICD-10-CM

## 2023-02-24 DIAGNOSIS — F33.0 MILD EPISODE OF RECURRENT MAJOR DEPRESSIVE DISORDER (HCC): ICD-10-CM

## 2023-02-24 RX ORDER — VALACYCLOVIR HYDROCHLORIDE 1 G/1
1000 TABLET, FILM COATED ORAL 3 TIMES DAILY
Qty: 21 TABLET | Refills: 2 | Status: SHIPPED | OUTPATIENT
Start: 2023-02-24 | End: 2023-03-03

## 2023-02-24 RX ORDER — BUPROPION HYDROCHLORIDE 300 MG/1
300 TABLET ORAL DAILY
Qty: 90 TABLET | Refills: 2 | Status: SHIPPED | OUTPATIENT
Start: 2023-02-24

## 2023-02-24 NOTE — TELEPHONE ENCOUNTER
Patient is out of wellbutrin and is requesting another prescription for vacyclovir; reports she has another cold sore post shingles two months ago

## 2023-03-08 ENCOUNTER — HOSPITAL ENCOUNTER (OUTPATIENT)
Dept: RADIOLOGY | Facility: HOSPITAL | Age: 68
Discharge: HOME | End: 2023-03-08

## 2023-03-08 VITALS — HEIGHT: 67 IN | WEIGHT: 182 LBS | BODY MASS INDEX: 28.56 KG/M2

## 2023-03-08 DIAGNOSIS — Z78.0 POST-MENOPAUSAL: ICD-10-CM

## 2023-03-08 DIAGNOSIS — Z13.820 SCREENING FOR OSTEOPOROSIS: ICD-10-CM

## 2023-03-22 ENCOUNTER — HOSPITAL ENCOUNTER (OUTPATIENT)
Dept: RADIOLOGY | Age: 68
Discharge: HOME | End: 2023-03-22

## 2023-03-22 VITALS — WEIGHT: 182 LBS | HEIGHT: 67 IN | BODY MASS INDEX: 28.56 KG/M2

## 2023-03-22 DIAGNOSIS — Z12.31 ENCOUNTER FOR SCREENING MAMMOGRAM FOR BREAST CANCER: ICD-10-CM

## 2023-03-31 DIAGNOSIS — M15.9 PRIMARY OSTEOARTHRITIS INVOLVING MULTIPLE JOINTS: ICD-10-CM

## 2023-03-31 DIAGNOSIS — M79.7 FIBROMYALGIA: ICD-10-CM

## 2023-04-01 RX ORDER — TRAMADOL HYDROCHLORIDE 50 MG/1
TABLET ORAL
Qty: 30 TABLET | Refills: 2 | Status: SHIPPED | OUTPATIENT
Start: 2023-04-01

## 2023-08-30 ENCOUNTER — TELEPHONE (OUTPATIENT)
Dept: FAMILY MEDICINE CLINIC | Facility: CLINIC | Age: 68
End: 2023-08-30

## 2023-08-30 NOTE — TELEPHONE ENCOUNTER
Called pt and she stated that she got a call from the surgeon;s office and she is now sitting in the emergency room

## 2023-08-30 NOTE — TELEPHONE ENCOUNTER
Pt called saying she is having pain from knee replacement in the back of leg (it's not excruciating) and was concerned about blood clot. She has been on the phone all day for medication refills with Surgeon's office and was advised to call our office and see about a referral for an ultrasound.

## 2023-08-31 ENCOUNTER — TELEPHONE (OUTPATIENT)
Dept: FAMILY MEDICINE CLINIC | Facility: CLINIC | Age: 68
End: 2023-08-31

## 2023-08-31 NOTE — TELEPHONE ENCOUNTER
Pt called with burning and frequent peeing, believes she has a UTI. Referred to urgent care since [facility] does not have any available appts.

## 2023-09-12 ENCOUNTER — OFFICE VISIT (OUTPATIENT)
Dept: GASTROENTEROLOGY | Facility: AMBULARY SURGERY CENTER | Age: 68
End: 2023-09-12
Payer: MEDICARE

## 2023-09-12 VITALS
OXYGEN SATURATION: 98 % | SYSTOLIC BLOOD PRESSURE: 122 MMHG | WEIGHT: 182.6 LBS | HEIGHT: 67 IN | BODY MASS INDEX: 28.66 KG/M2 | DIASTOLIC BLOOD PRESSURE: 70 MMHG | HEART RATE: 85 BPM

## 2023-09-12 DIAGNOSIS — K58.1 IRRITABLE BOWEL SYNDROME WITH CONSTIPATION: Primary | ICD-10-CM

## 2023-09-12 PROCEDURE — 99204 OFFICE O/P NEW MOD 45 MIN: CPT | Performed by: INTERNAL MEDICINE

## 2023-09-12 RX ORDER — AMOXICILLIN 250 MG
1 CAPSULE ORAL 2 TIMES DAILY
COMMUNITY
Start: 2023-08-22

## 2023-09-12 RX ORDER — NITROFURANTOIN 25; 75 MG/1; MG/1
CAPSULE ORAL
COMMUNITY
Start: 2023-09-09 | End: 2023-09-15 | Stop reason: SDUPTHER

## 2023-09-12 RX ORDER — FEXOFENADINE HCL AND PSEUDOEPHEDRINE HCI 180; 240 MG/1; MG/1
1 TABLET, EXTENDED RELEASE ORAL
COMMUNITY

## 2023-09-12 RX ORDER — ASPIRIN 81 MG/1
81 TABLET ORAL
COMMUNITY
Start: 2023-08-22 | End: 2023-10-02

## 2023-09-12 RX ORDER — ESTERIFIED ESTROGEN AND METHYLTESTOSTERONE .625; 1.25 MG/1; MG/1
TABLET ORAL EVERY 24 HOURS
COMMUNITY
Start: 2022-11-23

## 2023-09-12 NOTE — PROGRESS NOTES
[facility] - Outpatient Consultation  Maida Osman 79 y.o. female MRN: [id number]  Encounter: [id number]      PCP: MIKAL Lai  Referring: Bandar Lopez, [address] [facility]  Katrin Albrecht,  [address]      ASSESSMENT AND PLAN:      1. Irritable bowel syndrome with constipation  Presents a second GI opinion  associated abdominal bloating, cramping lower abdominal pain more consistent   with IBS-C, alternatively constant chronic idiopathic constipation with overflow  Previously failed colonoscopy given retained stool following 2-day bowel prep-last colonoscopy in 2021, and recall recommended in 1 year  Therapeutic failure of over-the-counter colonic stimulants, stool softeners, bulking agents/psyllium fiber, osmotic laxatives  She is currently taking Linzess 290 mcg plus Senokot twice daily, and continues to have constipation requiring vaginal splinting, positional changes, and persistent symptoms of bloating and abdominal pain  Recommend continue Linzess to 90 mcg, added MiraLAX with adjustment as described  - Ambulatory Referral to Physical Therapy; Future, for pelvic floor therapy given presence of rectocele, history of positional changes and vaginal splinting suggestive of pelvic floor weakness- would consider anorectal manometry vs sitzmarker testing if nonresponsive  - encouraged continued exercise as tolerated/increased mobility, yoga, stress management, avoidance of constipating medications, and water intake  - recommend diversification of diet with minimization of processed foods      ______________________________________________________________________    CC:  Chief Complaint   Patient presents with   • Advice Only     HX of IBS C&D       HPI:      Patient is a 60-year-old female referred for irregular bowel habits, with previous diagnosis of IBS constipation. She has chronic maxillary sinusitis, rosacea, depression, fibromyalgia on chronic tramadol, BMI 28.  Recently underwent right knee arthoplasty for osteoarthritis. She is ambulating with a walker. She is on Linzess 290 mcg started 1 year ago, Senokot twice daily. She has bowel movements every 3 days, with large stool passage, and hard stools. Passage of stool requires vaginal splinting, positional changes. This intervenes with fecal urgency, episodes of incontinence. She is also been previously diagnosed with a rectocele. There is associated abdominal bloating, and cramping lower abdominal pain which is relieved with a bowel movement. Denies any rectal bleeding, unintentional weight loss. Last colonoscopy in December 2021 by EP-there was stool noted in the cecum, normal ileal exam, 2-4 subcentimeter rectal polyps. This required a 2-day prep, recall recommended in 1 year. REVIEW OF SYSTEMS:    CONSTITUTIONAL: Denies any fever, chills, rigors, and weight loss. HEENT: No earache or tinnitus. Denies hearing loss or visual disturbances. CARDIOVASCULAR: No chest pain or palpitations. RESPIRATORY: Denies any cough, hemoptysis, shortness of breath or dyspnea on exertion. GASTROINTESTINAL: As noted in the History of Present Illness. GENITOURINARY: No problems with urination. Denies any hematuria or dysuria. NEUROLOGIC: No dizziness or vertigo, denies headaches. MUSCULOSKELETAL: Denies any muscle or joint pain. SKIN: Denies skin rashes or itching. ENDOCRINE: Denies excessive thirst. Denies intolerance to heat or cold. PSYCHOSOCIAL: Denies depression or anxiety. Denies any recent memory loss.        Historical Information   Past Medical History:   Diagnosis Date   • Allergic    • Arthritis    • Depression    • Fibromyalgia    • Inflammatory bowel disease    • Visual impairment      Past Surgical History:   Procedure Laterality Date   • HYSTERECTOMY     • REPAIR RECTOCELE       Social History   Social History     Substance and Sexual Activity   Alcohol Use Yes    Comment: couple times per year     Social History     Substance and Sexual Activity   Drug Use Never     Social History     Tobacco Use   Smoking Status Former   • Packs/day: 1.00   • Years: 10.00   • Total pack years: 10.00   • Types: Cigarettes   • Start date: 1971   • Quit date:    • Years since quittin.7   Smokeless Tobacco Never     Family History   Problem Relation Age of Onset   • Cancer Mother    • Pancreatic cancer Mother    • Dementia Father    • Heart disease Father    • Arthritis Father    • Skin cancer Father    • Skin cancer Sister    • Cancer Maternal Grandmother    • No Known Problems Maternal Grandfather    • No Known Problems Paternal Grandmother    • No Known Problems Paternal Grandfather    • Alcohol abuse Brother        Meds/Allergies       Current Outpatient Medications:   •  aspirin (ECOTRIN LOW STRENGTH) 81 mg EC tablet  •  khlp-cdsobaq-jth-hydrocort (CORTISPORIN) 1 % ointment  •  buPROPion (WELLBUTRIN XL) 300 mg 24 hr tablet  •  cyclobenzaprine (FLEXERIL) 5 mg tablet  •  estrogens, conjugated,-methyltestosterone (ESTRATEST HS) 0.625-1.25 MG per tablet  •  fexofenadine-pseudoephedrine (ALLEGRA-D 24) 180-240 MG per 24 hr tablet  •  gabapentin (NEURONTIN) 300 mg capsule  •  Linzess 290 MCG CAPS  •  mupirocin (BACTROBAN) 2 % ointment  •  nitrofurantoin (MACROBID) 100 mg capsule  •  senna-docusate sodium (SENOKOT S) 8.6-50 mg per tablet  •  traMADol (ULTRAM) 50 mg tablet  •  valACYclovir (VALTREX) 1,000 mg tablet  •  estrogens, conjugated,-methyltestosterone (ESTRATEST HS) 0.625-1.25 MG per tablet  •  fexofenadine (ALLEGRA) 180 MG tablet  •  metroNIDAZOLE (METROGEL) 0.75 % gel  •  Milnacipran HCl 50 MG TABS    Allergies   Allergen Reactions   • Ciprofloxacin GI Intolerance   • Nsaids GI Bleeding and GI Intolerance     Previous ulcer   • Penicillins Hives           Objective     Blood pressure 122/70, pulse 85, height 5' 7" (1.702 m), weight 82.8 kg (182 lb 9.6 oz), SpO2 98 %. Body mass index is 28.6 kg/m².      PHYSICAL EXAM:      General Appearance:   Alert, cooperative, no distress, ambulates with walker   HEENT:   Normocephalic, atraumatic, anicteric. Neck:  Supple, symmetrical, trachea midline   Lungs:   Clear to auscultation bilaterally; no rales, rhonchi or wheezing; respirations unlabored    Heart[de-identified]   Regular rate and rhythm; no murmur, rub, or gallop. Abdomen:   Soft, non-tender, non-distended; normal bowel sounds; no masses, no organomegaly    Genitalia:   Deferred    Rectal:   Deferred    Extremities:  No cyanosis, clubbing or edema    Pulses:  2+ and symmetric    Skin:  No jaundice, rashes, or lesions    Lymph nodes:  No palpable cervical lymphadenopathy        Lab Results:     No results found for: "WBC", "HGB", "HCT", "MCV", "PLT"    No results found for: "NA", "K", "CL", "CO2", "ANIONGAP", "BUN", "CREATININE", "GLUCOSE", "GLUF", "CALCIUM", "CORRECTEDCA", "AST", "ALT", "ALKPHOS", "PROT", "BILITOT", "EGFR"    No results found for: "INR", "PROTIME"      Radiology Results:   VAS VENOUS DUPLEX LOWER EXTREMITY RIGHT    Result Date: 8/30/2023  Narrative: History: Right lower extremity pain and swelling. Previous right knee replacement. Real-time B-mode ultrasound, spectral Doppler analysis and color Doppler examination of the right lower extremity were performed. The right common femoral vein, femoral vein, popliteal vein and visualized calf veins are normal in caliber and compressibility. These veins demonstrate phasic venous waveforms, with normal blood flow on Doppler examination. Within the right popliteal fossa, there is an ovoid cystic structure containing low-level internal echoes. This structure measures 4.8 x 0.8 x 1.7 cm. There is no blood flow within the structure on color Doppler examination. Impression: Impression: 1. No evidence of DVT in the right femoral/popliteal system.  2. An ovoid complex cystic structure within the right popliteal fossa may represent a complex Baker's cyst or hematoma related to prior surgery. Workstation:[id number]    XR knee 1 or 2 vw right    Result Date: 8/21/2023  Narrative: This result has an attachment that is not available. IMRPESSION:  TWO VIEWS OF THE RIGHT KNEE REVEAL NORMAL ANATOMIC ALIGNMENT, PROSTHESIS IN EXCELLENT POSITION, AND NO SIGN OF LOOSENING OF THE IMPLANT. Portions of the record may have been created with voice recognition software. Occasional wrong word or "sound a like" substitutions may have occurred due to the inherent limitations of voice recognition software. Read the chart carefully and recognize, using context, where substitutions have occurred.

## 2023-09-12 NOTE — PATIENT INSTRUCTIONS
You have been prescribed miralax (polyethylene glycol) for treatment of your CONSTIPATION. Start 1 capful daily. Take this dose x 3 days. If your symptoms are improved, great! Continue this dose daily. If you have diarrhea (stools are loose, associated with accidents, or urgency) with this dose, cut down to 1/2 capful daily. Continue to titrate down until you find the dose for you. This might be 1 tbsp daily. Wait 3 days before making a change. If you have continued constipation with 1 capful daily, you may need a higher dose. Start with 1.5 capfuls daily and titrate upward. Eg might need 1 capful twice daily. There is no maximum dose, whatever works for you. Again, wait 3 days before making a change.

## 2023-09-15 ENCOUNTER — TELEPHONE (OUTPATIENT)
Age: 68
End: 2023-09-15

## 2023-09-15 ENCOUNTER — NURSE TRIAGE (OUTPATIENT)
Age: 68
End: 2023-09-15

## 2023-09-15 DIAGNOSIS — N39.0 URINARY TRACT INFECTION WITHOUT HEMATURIA, SITE UNSPECIFIED: Primary | ICD-10-CM

## 2023-09-15 RX ORDER — NITROFURANTOIN 25; 75 MG/1; MG/1
100 CAPSULE ORAL 2 TIMES DAILY
Qty: 20 CAPSULE | Refills: 0 | Status: SHIPPED | OUTPATIENT
Start: 2023-09-15

## 2023-09-15 NOTE — TELEPHONE ENCOUNTER
Patient called in to report UTI [place] [address] [place] 8/31 and prescribed Macrobid BID x 5 days. UTI symptoms reocured 9/9-9/10 with burning pain with urination and blood in urine. Patient received second prescription (spouse/physician)  for Macrobid BID x 5 days and completed second round of therapy 9/14. Asymptomatic at this time. Patient wants to know if she should have additional days of antibiotic therapy. Please follow up with patient.

## 2023-09-15 NOTE — TELEPHONE ENCOUNTER
Patient called in to report UTI diagnosed at [address] [place] 8/31 and prescribed Macrobid BID x 5 days. UTI symptoms reocured 9/9-9/10 with burning pain with urination and blood in urine. Patient received second prescription for Macrobid BID x 5 days and completed second round of therapy 9/14. Asymptomatic at this time. Patient wants to know if she should have additional days of antibiotic therapy. Please follow up with patient. Reason for Disposition  • Reasonable improvement on antibiotics and no fever    Answer Assessment - Initial Assessment Questions  1. ANTIBIOTIC: "What antibiotic are you taking?" "How many times per day?"      Macrobid x 5 days post [facility] 8/31  2. DURATION: "When was the antibiotic started?"      8/31  3. MAIN SYMPTOM: "What is the main symptom you are concerned about?"      *No Answer*  4. FEVER: "Do you have a fever?" If Yes, ask: "What is it, how was it measured, and when did it start?"      Denies  5.  PAIN: "How bad is the pain?" (Scale 1 - 10; mild, moderate or severe)  -MILD (1-3): doesn't interfere with normal activities  -MODERATE (4-7): interferes with normal activities (e.g., work or school) or awakens from sleep  -SEVERE (8-10): excruciating pain and patient unable to do any normal activities      *No Answer*  6. OTHER SYMPTOMS: "Do you have any other symptoms?" (e.g., flank pain, vaginal discharge, blood in urine) blood in urine, burning and pain with urination  REGNANCY: "Is there any chance you are pregnant?" "When was your last menstrual period?"      *No Answer*    Protocols used: URINARY TRACT INFECTION ON ANTIBIOTIC FOLLOW-UP CALL - FEMALE-ADULT-[place]

## 2023-09-15 NOTE — TELEPHONE ENCOUNTER
Regarding: UTI Medication (Macrobid)  ----- Message from Smita Mortensen sent at 9/15/2023  1:03 PM EDT -----  Pt was calling to find out if she should be on a longer dosage of the medication since she was seen at urgent care last week and took 5 days of macrobid with a recurrence of UTI. Her  is a physician and he refilled the script which she has finished again, but she is concerned the UTI will reoccur. Please advise.

## 2023-10-16 ENCOUNTER — NURSE TRIAGE (OUTPATIENT)
Age: 68
End: 2023-10-16

## 2023-10-16 DIAGNOSIS — K59.09 OTHER CONSTIPATION: Primary | ICD-10-CM

## 2023-10-16 RX ORDER — LINACLOTIDE 290 UG/1
290 CAPSULE, GELATIN COATED ORAL DAILY
Qty: 30 CAPSULE | Refills: 11 | Status: SHIPPED | OUTPATIENT
Start: 2023-10-16

## 2023-11-17 DIAGNOSIS — F33.0 MILD EPISODE OF RECURRENT MAJOR DEPRESSIVE DISORDER (HCC): ICD-10-CM

## 2023-11-17 RX ORDER — BUPROPION HYDROCHLORIDE 300 MG/1
300 TABLET ORAL DAILY
Qty: 90 TABLET | Refills: 0 | Status: SHIPPED | OUTPATIENT
Start: 2023-11-17

## 2023-11-24 ENCOUNTER — TELEPHONE (OUTPATIENT)
Age: 68
End: 2023-11-24

## 2023-11-24 NOTE — TELEPHONE ENCOUNTER
Patient called to schedule an appointment for a possible sinus infection. Symptoms began about 4 weeks ago and she has a headache, burning behind her eyes and grey green mucus. After checking with the practice, patient was advised to go to [facility] and offered to assist with scheduling an appointment. Patient stated she will continue to use the treatment that she had at home and will go to urgent care if symptoms are not improving.

## 2024-02-12 DIAGNOSIS — F33.0 MILD EPISODE OF RECURRENT MAJOR DEPRESSIVE DISORDER (HCC): ICD-10-CM

## 2024-02-12 RX ORDER — BUPROPION HYDROCHLORIDE 300 MG/1
300 TABLET ORAL DAILY
Qty: 90 TABLET | Refills: 1 | Status: SHIPPED | OUTPATIENT
Start: 2024-02-12

## 2024-04-05 ENCOUNTER — HOSPITAL ENCOUNTER (OUTPATIENT)
Dept: RADIOLOGY | Age: 69
Discharge: HOME | End: 2024-04-05
Payer: MEDICARE

## 2024-04-05 VITALS — WEIGHT: 182 LBS | BODY MASS INDEX: 28.56 KG/M2 | HEIGHT: 67 IN

## 2024-04-05 DIAGNOSIS — Z12.31 VISIT FOR SCREENING MAMMOGRAM: ICD-10-CM

## 2024-04-05 PROCEDURE — 77067 SCR MAMMO BI INCL CAD: CPT

## 2024-04-05 PROCEDURE — 77063 BREAST TOMOSYNTHESIS BI: CPT

## 2024-05-20 ENCOUNTER — TELEPHONE (OUTPATIENT)
Dept: FAMILY MEDICINE CLINIC | Facility: CLINIC | Age: 69
End: 2024-05-20

## 2024-05-20 DIAGNOSIS — R23.8 VESICULAR RASH: ICD-10-CM

## 2024-05-20 RX ORDER — VALACYCLOVIR HYDROCHLORIDE 1 G/1
1000 TABLET, FILM COATED ORAL 3 TIMES DAILY
Qty: 21 TABLET | Refills: 2 | Status: SHIPPED | OUTPATIENT
Start: 2024-05-20 | End: 2024-05-27

## 2024-05-20 NOTE — TELEPHONE ENCOUNTER
Pt reports having shingles approx 1 yr ago -- she now reports she started w/ Sx 2 days ago and believes she has cold sore appearing as well. She states rash has not started yet -- the painful sensation is on L shoulder/back similar to previous case of shingles.       She is taking Valtrex 1000 mg (from old Rx -- unknown what it was Rx for) Took 2 tablets yesterday and one tablet today and has two additional tablets left.

## 2024-06-14 ENCOUNTER — OFFICE VISIT (OUTPATIENT)
Dept: FAMILY MEDICINE CLINIC | Facility: CLINIC | Age: 69
End: 2024-06-14
Payer: MEDICARE

## 2024-06-14 VITALS
OXYGEN SATURATION: 96 % | HEART RATE: 78 BPM | SYSTOLIC BLOOD PRESSURE: 118 MMHG | TEMPERATURE: 97.9 F | WEIGHT: 189 LBS | HEIGHT: 67 IN | BODY MASS INDEX: 29.66 KG/M2 | DIASTOLIC BLOOD PRESSURE: 78 MMHG

## 2024-06-14 DIAGNOSIS — Z13.29 SCREENING FOR THYROID DISORDER: ICD-10-CM

## 2024-06-14 DIAGNOSIS — Z23 NEED FOR PNEUMOCOCCAL 20-VALENT CONJUGATE VACCINATION: ICD-10-CM

## 2024-06-14 DIAGNOSIS — Z13.220 LIPID SCREENING: ICD-10-CM

## 2024-06-14 DIAGNOSIS — F11.20 CONTINUOUS OPIOID DEPENDENCE (HCC): ICD-10-CM

## 2024-06-14 DIAGNOSIS — Z23 ENCOUNTER FOR IMMUNIZATION: ICD-10-CM

## 2024-06-14 DIAGNOSIS — B00.1 HERPES LABIALIS: ICD-10-CM

## 2024-06-14 DIAGNOSIS — Z00.00 MEDICARE ANNUAL WELLNESS VISIT, SUBSEQUENT: Primary | ICD-10-CM

## 2024-06-14 DIAGNOSIS — Z79.899 DRUG THERAPY: ICD-10-CM

## 2024-06-14 DIAGNOSIS — M46.1 SACROILIITIS (HCC): ICD-10-CM

## 2024-06-14 DIAGNOSIS — N39.0 URINARY TRACT INFECTION WITHOUT HEMATURIA, SITE UNSPECIFIED: ICD-10-CM

## 2024-06-14 DIAGNOSIS — Z23 NEED FOR SHINGLES VACCINE: ICD-10-CM

## 2024-06-14 PROCEDURE — 90677 PCV20 VACCINE IM: CPT | Performed by: NURSE PRACTITIONER

## 2024-06-14 PROCEDURE — G0009 ADMIN PNEUMOCOCCAL VACCINE: HCPCS | Performed by: NURSE PRACTITIONER

## 2024-06-14 PROCEDURE — G0439 PPPS, SUBSEQ VISIT: HCPCS | Performed by: NURSE PRACTITIONER

## 2024-06-14 RX ORDER — VALACYCLOVIR HYDROCHLORIDE 1 G/1
TABLET, FILM COATED ORAL
Qty: 24 TABLET | Refills: 2 | Status: SHIPPED | OUTPATIENT
Start: 2024-06-14 | End: 2025-05-26

## 2024-06-14 RX ORDER — NITROFURANTOIN 25; 75 MG/1; MG/1
100 CAPSULE ORAL 2 TIMES DAILY
Qty: 20 CAPSULE | Refills: 2 | Status: SHIPPED | OUTPATIENT
Start: 2024-06-14

## 2024-06-14 RX ORDER — ZOSTER VACCINE RECOMBINANT, ADJUVANTED 50 MCG/0.5
0.5 KIT INTRAMUSCULAR ONCE
Qty: 1 EACH | Refills: 1 | Status: SHIPPED | OUTPATIENT
Start: 2024-06-14 | End: 2024-06-14

## 2024-06-14 NOTE — PROGRESS NOTES
Ambulatory Visit  Name: Tere Dee      : 1955      MRN: [id number]  Encounter Provider: MIKAL Galicia  Encounter Date: 2024   Encounter department: [facility]    Assessment & Plan   1. Medicare annual wellness visit, subsequent  2. Herpes labialis  -     valACYclovir (VALTREX) 1,000 mg tablet; First sign of cold sore, take 2 tabs immediately, repeat x 1 12 hours later  3. Need for pneumococcal 20-valent conjugate vaccination  4. Continuous opioid dependence (HCC)  5. Sacroiliitis (HCC)  6. Need for shingles vaccine  -     Zoster Vac Recomb Adjuvanted (Shingrix) 50 MCG/0.5ML SUSR; Inject 0.5 mL into a muscle once for 1 dose Repeat dose in 2 to 6 months  7. Encounter for immunization  -     Pneumococcal Conjugate Vaccine 20-valent (Pcv20)  8. Lipid screening  -     Lipid panel; Future  9. Screening for thyroid disorder  -     TSH, 3rd generation with Free T4 reflex  10. Drug therapy  -     TSH, 3rd generation with Free T4 reflex  -     Lipid panel; Future  11. Urinary tract infection without hematuria, site unspecified  -     nitrofurantoin (MACROBID) 100 mg capsule; Take 1 capsule (100 mg total) by mouth 2 (two) times a day       Preventive health issues were discussed with patient, and age appropriate screening tests were ordered as noted in patient's After Visit Summary. Personalized health advice and appropriate referrals for health education or preventive services given if needed, as noted in patient's After Visit Summary.    History of Present Illness     HPI   Patient Care Team:  MIKAL Galicia as PCP - General (Family Medicine)    Review of Systems   Gastrointestinal:  Positive for constipation and diarrhea.        Under the care of gastroDr. Lucero   Genitourinary:         Occasional UTIs   Musculoskeletal:  Positive for arthralgias.     Medical History Reviewed by provider this encounter:       Annual Wellness Visit Questionnaire   Tere is here for her  Subsequent Wellness visit. Last Medicare Wellness visit information reviewed, patient interviewed and updates made to the record today.      Health Risk Assessment:   Patient rates overall health as good. Patient feels that their physical health rating is slightly better. Patient is satisfied with their life. Eyesight was rated as same. Hearing was rated as same. Patient feels that their emotional and mental health rating is same. Patients states they are never, rarely angry. Patient states they are sometimes unusually tired/fatigued. Pain experienced in the last 7 days has been some. Patient's pain rating has been 1/10. Patient states that she has experienced no weight loss or gain in last 6 months. L spine pain as well as neck -- chronic since age 15     Depression Screening:   PHQ-9 Score: 0      Fall Risk Screening:   In the past year, patient has experienced: no history of falling in past year      Urinary Incontinence Screening:   Patient has not leaked urine accidently in the last six months.     Home Safety:  Patient does not have trouble with stairs inside or outside of their home. Patient has working smoke alarms and has working carbon monoxide detector. Home safety hazards include: none.     Nutrition:   Current diet is Regular, Unhealthy, Other (please comment), Low Cholesterol and Low Saturated Fat. Pt has regular protein intake, as well as fruits and vegetables     Medications:   Patient is currently taking over-the-counter supplements. OTC medications include: see medication list. Patient is able to manage medications.     Activities of Daily Living (ADLs)/Instrumental Activities of Daily Living (IADLs):   Walk and transfer into and out of bed and chair?: Yes  Dress and groom yourself?: Yes    Bathe or shower yourself?: Yes    Feed yourself? Yes  Do your laundry/housekeeping?: Yes  Manage your money, pay your bills and track your expenses?: Yes  Make your own meals?: Yes    Do your own shopping?:  Yes    Previous Hospitalizations:   Any hospitalizations or ED visits within the last 12 months?: Yes    How many hospitalizations have you had in the last year?: 1-2    Hospitalization Comments: 8/23 [facility] s[facility]     Advance Care Planning:   Living will: No    Durable POA for healthcare: No    Advanced directive: No    Advanced directive counseling given: Yes    ACP document given: Yes    Patient declined ACP directive: No    End of Life Decisions reviewed with patient: Yes    Provider agrees with end of life decisions: Yes      Cognitive Screening:   Provider or family/friend/caregiver concerned regarding cognition?: No    PREVENTIVE SCREENINGS      Cardiovascular Screening:    General: Risks and Benefits Discussed    Due for: Lipid Panel      Diabetes Screening:     General: Risks and Benefits Discussed    Due for: Blood Glucose      Colorectal Cancer Screening:     General: Screening Current      Breast Cancer Screening:     General: Screening Current      Cervical Cancer Screening:    General: Screening Not Indicated      Osteoporosis Screening:    General: Risks and Benefits Discussed    Due for: DXA Axial      Abdominal Aortic Aneurysm (AAA) Screening:        General: Screening Not Indicated      Lung Cancer Screening:     General: Screening Not Indicated      Hepatitis C Screening:    General: Screening Current    Screening, Brief Intervention, and Referral to Treatment (SBIRT)    Screening  Typical number of drinks in a day: 0  Typical number of drinks in a week: 0  Interpretation: Low risk drinking behavior.    Single Item Drug Screening:  How often have you used an illegal drug (including marijuana) or a prescription medication for non-medical reasons in the past year? never    Single Item Drug Screen Score: 0  Interpretation: Negative screen for possible drug use disorder    Brief Intervention  Alcohol & drug use screenings were reviewed. No concerns regarding substance use disorder identified.     Review  "of Current Opioid Use  Opioid Risk Tool (ORT) Score: 0  Opioid Risk Tool (ORT) Interpretation: Score 0-3: Low risk for opioid misuse    Other Counseling Topics:   Regular weightbearing exercise and calcium and vitamin D intake.     Social Determinants of Health     Financial Resource Strain: Low Risk  (8/21/2023)    [facility]    Overall Financial Resource Strain (CARDIA)     Difficulty of Paying Living Expenses: Not very hard   Food Insecurity: No Food Insecurity (6/14/2024)    Hunger Vital Sign     Worried About Running Out of Food in the Last Year: Never true     Ran Out of Food in the Last Year: Never true   Transportation Needs: No Transportation Needs (6/14/2024)    PRAPARE - Transportation     Lack of Transportation (Medical): No     Lack of Transportation (Non-Medical): No   Housing Stability: Low Risk  (6/14/2024)    Housing Stability Vital Sign     Unable to Pay for Housing in the Last Year: No     Number of Times Moved in the Last Year: 1     Homeless in the Last Year: No   Utilities: Not At Risk (6/14/2024)    [facility] Utilities     Threatened with loss of utilities: No     No results found.    Objective     /78 (BP Location: Left arm, Patient Position: Sitting, Cuff Size: Standard)   Pulse 78   Temp 97.9 °F (36.6 °C) (Temporal)   Ht 5' 7\" (1.702 m)   Wt 85.7 kg (189 lb)   SpO2 96%   BMI 29.60 kg/m²     Physical Exam  Vitals and nursing note reviewed.   Constitutional:       General: She is not in acute distress.     Appearance: Normal appearance.   Cardiovascular:      Rate and Rhythm: Normal rate and regular rhythm.      Pulses: Normal pulses.   Pulmonary:      Effort: Pulmonary effort is normal.      Breath sounds: Normal breath sounds.   Neurological:      General: No focal deficit present.      Mental Status: She is alert. Mental status is at baseline.       Administrative Statements           "

## 2024-07-03 ENCOUNTER — OFFICE VISIT (OUTPATIENT)
Dept: FAMILY MEDICINE CLINIC | Facility: CLINIC | Age: 69
End: 2024-07-03
Payer: MEDICARE

## 2024-07-03 VITALS
HEART RATE: 97 BPM | DIASTOLIC BLOOD PRESSURE: 70 MMHG | SYSTOLIC BLOOD PRESSURE: 138 MMHG | RESPIRATION RATE: 17 BRPM | OXYGEN SATURATION: 98 % | WEIGHT: 189 LBS | BODY MASS INDEX: 29.66 KG/M2 | TEMPERATURE: 97.2 F | HEIGHT: 67 IN

## 2024-07-03 DIAGNOSIS — J32.0 LEFT MAXILLARY SINUSITIS: Primary | ICD-10-CM

## 2024-07-03 PROCEDURE — 99213 OFFICE O/P EST LOW 20 MIN: CPT | Performed by: NURSE PRACTITIONER

## 2024-07-03 PROCEDURE — G2211 COMPLEX E/M VISIT ADD ON: HCPCS | Performed by: NURSE PRACTITIONER

## 2024-07-03 RX ORDER — AZITHROMYCIN 250 MG/1
TABLET, FILM COATED ORAL
Qty: 6 TABLET | Refills: 0 | Status: SHIPPED | OUTPATIENT
Start: 2024-07-03 | End: 2024-07-07

## 2024-07-03 NOTE — PROGRESS NOTES
Assessment/Plan:    1. Left maxillary sinusitis  -     azithromycin (ZITHROMAX) 250 mg tablet; Take 2 tablets today then 1 tablet daily x 4 days      The case discussed with patient using patient centered shared decision making.The patient was counseled regarding instructions for management,-- risk factor reductions,-- prognosis,-- impressions,-- risks and benefits of treatment options,-- importance of compliance with treatment. I have reviewed the instructions with the patient, answering all questions to her satisfaction.    Refractory maxillary sinusitis  Explained this may not be bacterial  There is a chance clinician may have treated when in viral stage  Will treat with antibiotic  Probiotics recommended   Continue with supportive care measures  Follow-up if fail to improve in 10 days  May need follow-up with ENT    There are no Patient Instructions on file for this visit.    No follow-ups on file.    Subjective:      Patient ID: Tere Dee is a 68 y.o. female.    Chief Complaint   Patient presents with    Follow-up     Express care- sinusitis       Here for 2 week follow up sinusitis  Seen at [facility] express care  Had symptoms consistent with left bacterial maxillary sinusitis  Was treated with doxycycline and Medrol pack  Did not feel much better after this treatment  Has chronic sinus issues for years  Has been using her nasal rinses with the addition of mupirocin  Has history of sinuplasty  Denies fever, purulent nasal discharge, dizziness          The following portions of the patient's history were reviewed and updated as appropriate: allergies, current medications, past family history, past medical history, past social history, past surgical history and problem list.    Review of Systems   Constitutional:  Positive for fatigue. Negative for fever.   HENT:  Positive for congestion and sinus pain.    Respiratory:  Negative for shortness of breath.    Cardiovascular: Negative.    Gastrointestinal:  Negative  for abdominal pain.   Neurological:  Positive for headaches. Negative for dizziness and weakness.         Current Outpatient Medications   Medication Sig Dispense Refill    aspirin (ECOTRIN LOW STRENGTH) 81 mg EC tablet Take 81 mg by mouth      azithromycin (ZITHROMAX) 250 mg tablet Take 2 tablets today then 1 tablet daily x 4 days 6 tablet 0    inuj-klhsliw-ogs-hydrocort (CORTISPORIN) 1 % ointment Administer to both eyes every 8 (eight) hours 3.5 g 4    buPROPion (WELLBUTRIN XL) 300 mg 24 hr tablet TAKE 1 TABLET(300 MG) BY MOUTH DAILY 90 tablet 1    cyclobenzaprine (FLEXERIL) 5 mg tablet Take 1 tablet (5 mg total) by mouth daily at bedtime as needed for muscle spasms 30 tablet 5    estrogens, conjugated,-methyltestosterone (ESTRATEST HS) 0.625-1.25 MG per tablet Take one every other day 45 tablet 2    fexofenadine-pseudoephedrine (ALLEGRA-D 24) 180-240 MG per 24 hr tablet Take 1 tablet by mouth      gabapentin (NEURONTIN) 300 mg capsule Take 300 mg by mouth      Linzess 290 MCG CAPS Take 1 capsule by mouth daily 30 capsule 11    Milnacipran HCl 50 MG TABS Take by mouth 2 (two) times a day      nitrofurantoin (MACROBID) 100 mg capsule Take 1 capsule (100 mg total) by mouth 2 (two) times a day 20 capsule 2    senna-docusate sodium (SENOKOT S) 8.6-50 mg per tablet Take 1 tablet by mouth 2 (two) times a day      traMADol (ULTRAM) 50 mg tablet TAKE 1 TABLET(50 MG) BY MOUTH EVERY 12 HOURS 30 tablet 2    valACYclovir (VALTREX) 1,000 mg tablet First sign of cold sore, take 2 tabs immediately, repeat x 1 12 hours later 24 tablet 2    estrogens, conjugated,-methyltestosterone (ESTRATEST HS) 0.625-1.25 MG per tablet every 24 hours (Patient not taking: Reported on 9/12/2023)      metroNIDAZOLE (METROGEL) 0.75 % gel Apply topically 2 (two) times a day (Patient not taking: Reported on 9/12/2023) 45 g 11    mupirocin (BACTROBAN) 2 % ointment  (Patient not taking: Reported on 6/14/2024)      valACYclovir (VALTREX) 1,000 mg tablet  "Take 1 tablet (1,000 mg total) by mouth 3 (three) times a day for 7 days (Patient not taking: Reported on 6/14/2024) 21 tablet 2     No current facility-administered medications for this visit.       Objective:    /70 (BP Location: Left arm, Patient Position: Sitting, Cuff Size: Standard)   Pulse 97   Temp (!) 97.2 °F (36.2 °C) (Temporal)   Resp 17   Ht 5' 7\" (1.702 m)   Wt 85.7 kg (189 lb)   SpO2 98%   BMI 29.60 kg/m²        Physical Exam  Vitals and nursing note reviewed.   Constitutional:       General: She is not in acute distress.     Appearance: Normal appearance. She is not ill-appearing.   HENT:      Head: Normocephalic and atraumatic.      Right Ear: Tympanic membrane normal.      Left Ear: Tympanic membrane normal.      Nose: Congestion present.      Left Turbinates: Swollen.      Left Sinus: Maxillary sinus tenderness present.   Cardiovascular:      Rate and Rhythm: Normal rate and regular rhythm.      Pulses: Normal pulses.   Pulmonary:      Effort: Pulmonary effort is normal.      Breath sounds: Normal breath sounds.   Lymphadenopathy:      Cervical: No cervical adenopathy.   Neurological:      General: No focal deficit present.      Mental Status: She is alert. Mental status is at baseline.   Psychiatric:         Mood and Affect: Mood normal.                MIKAL Price  "

## 2024-08-16 DIAGNOSIS — F33.0 MILD EPISODE OF RECURRENT MAJOR DEPRESSIVE DISORDER (HCC): ICD-10-CM

## 2024-08-16 RX ORDER — BUPROPION HYDROCHLORIDE 300 MG/1
300 TABLET ORAL DAILY
Qty: 90 TABLET | Refills: 1 | Status: SHIPPED | OUTPATIENT
Start: 2024-08-16

## 2024-11-01 DIAGNOSIS — K59.09 OTHER CONSTIPATION: ICD-10-CM

## 2024-11-01 RX ORDER — LINACLOTIDE 290 UG/1
290 CAPSULE, GELATIN COATED ORAL DAILY
Qty: 30 CAPSULE | Refills: 11 | Status: SHIPPED | OUTPATIENT
Start: 2024-11-01

## 2024-11-06 ENCOUNTER — TELEPHONE (OUTPATIENT)
Age: 69
End: 2024-11-06

## 2024-11-06 NOTE — TELEPHONE ENCOUNTER
Pt. Calling stating Linzess 290 mcg has a high copay, pt. Tried GoodRx and the cost was higher, pt. Is asking for an alternative for constipation as she can't afford oop cost $500 after insurance, please advise

## 2024-11-06 NOTE — TELEPHONE ENCOUNTER
Patient called in she went back to Pharmacy and had them look back into to this and they were able to get Ins price no need for an alter the Insurance did cover med

## 2024-12-09 ENCOUNTER — OFFICE VISIT (OUTPATIENT)
Dept: GASTROENTEROLOGY | Facility: CLINIC | Age: 69
End: 2024-12-09
Payer: MEDICARE

## 2024-12-09 ENCOUNTER — TELEPHONE (OUTPATIENT)
Dept: GASTROENTEROLOGY | Facility: CLINIC | Age: 69
End: 2024-12-09

## 2024-12-09 VITALS
SYSTOLIC BLOOD PRESSURE: 99 MMHG | DIASTOLIC BLOOD PRESSURE: 65 MMHG | HEART RATE: 89 BPM | HEIGHT: 67 IN | WEIGHT: 193 LBS | BODY MASS INDEX: 30.29 KG/M2

## 2024-12-09 DIAGNOSIS — Z86.0100 HX OF COLONIC POLYPS: ICD-10-CM

## 2024-12-09 DIAGNOSIS — K58.1 IRRITABLE BOWEL SYNDROME WITH CONSTIPATION: Primary | ICD-10-CM

## 2024-12-09 PROCEDURE — 99214 OFFICE O/P EST MOD 30 MIN: CPT | Performed by: PHYSICIAN ASSISTANT

## 2024-12-09 RX ORDER — POLYETHYLENE GLYCOL 3350 17 G/17G
17 POWDER, FOR SOLUTION ORAL DAILY
COMMUNITY

## 2024-12-09 RX ORDER — ACETAMINOPHEN 500 MG
500 TABLET ORAL EVERY 6 HOURS PRN
COMMUNITY

## 2024-12-09 RX ORDER — SODIUM CHLORIDE, SODIUM LACTATE, POTASSIUM CHLORIDE, CALCIUM CHLORIDE 600; 310; 30; 20 MG/100ML; MG/100ML; MG/100ML; MG/100ML
125 INJECTION, SOLUTION INTRAVENOUS CONTINUOUS
OUTPATIENT
Start: 2024-12-09

## 2024-12-09 NOTE — TELEPHONE ENCOUNTER
Scheduled date of colonoscopy (as of today):2/10/25  Physician performing colonoscopy:Dr Lucero   Location of colonoscopy:[facility]   Bowel prep reviewed with patient:2 day golytely with 64 oz of gatorade and 238 gm miralax   Instructions reviewed with patient by:sb  Clearances: none

## 2024-12-09 NOTE — PROGRESS NOTES
[facility] - Outpatient Follow-up Note  Tere Dee 69 y.o. female MRN: [id number]  Encounter: [id number]          ASSESSMENT AND PLAN:      1. Irritable bowel syndrome with constipation (Primary)  Patient with irritable bowel, tends to have fluctuating bowel habits but primarily has had longstanding constipation, would recommend fiber supplementation with 1 tablespoon of  Metamucil daily, can continue on the Linzess and MiraLAX, would obviously have to adjust accordingly if persistent loose stools, patient would like to see how she does with the functional medicine doctor to see if this will help with her bowel since she likely will be taking supplements to focus on gut health and also eating differently    2. Hx of colonic polyps  Patient with history of colon polyps with poor prep on a 2-day prep so ordered full MiraLAX prep 2 days prior and GoLytely 1 day prior, instructions given      ______________________________________________________________________    SUBJECTIVE:      69-year-old female presents for follow up. Pt with previous diagnosis of IBS constipation.  She has chronic maxillary sinusitis, rosacea, depression, fibromyalgia on chronic tramadol.  She is on Linzess 290 mcg , Miralax daily.  Passage of stool requires vaginal splinting, positional changes.  This intervenes with fecal urgency, episodes of incontinence.  She is also been previously diagnosed with a rectocele which was repaired. Was referred to pelvic floor PT. The patient had called recently and Linzess had a co-pay and was asking for alternative constipation medication.  Patient reports that she actually was able then to afford the medication we get they were not using her insurance so she reports that her bowels were all over the place where she would have diarrhea and then floating stool then constipation and then loose stools and even had an accident.  She reports that she never went to see the pelvic floor  PT and she actually is going to start a new program with functional medicine and she will be focused on gut health that she has not sure if this will affect her bowels.  She has history of fibromyalgia as well.          Last colonoscopy in 2021 by [facility]-there was stool noted in the cecum, normal ileal exam, 2-4 subcentimeter rectal polyps.  This required a 2-day prep, recall recommended in 1 year.       REVIEW OF SYSTEMS IS OTHERWISE NEGATIVE.      Historical Information   Past Medical History:   Diagnosis Date    Allergic     Arthritis     Depression     Fibromyalgia     Inflammatory bowel disease     Visual impairment      Past Surgical History:   Procedure Laterality Date    HYSTERECTOMY      REPAIR RECTOCELE       Social History   Social History     Substance and Sexual Activity   Alcohol Use Yes    Comment: couple times per year     Social History     Substance and Sexual Activity   Drug Use Never     Social History     Tobacco Use   Smoking Status Former    Current packs/day: 0.00    Average packs/day: 1 pack/day for 10.0 years (10.0 ttl pk-yrs)    Types: Cigarettes    Start date: 1971    Quit date:     Years since quittin.9   Smokeless Tobacco Never     Family History   Problem Relation Age of Onset    Cancer Mother     Pancreatic cancer Mother     Dementia Father     Heart disease Father     Arthritis Father     Skin cancer Father     Skin cancer Sister     Cancer Maternal Grandmother     No Known Problems Maternal Grandfather     No Known Problems Paternal Grandmother     No Known Problems Paternal Grandfather     Alcohol abuse Brother        Meds/Allergies       Current Outpatient Medications:     aspirin (ECOTRIN LOW STRENGTH) 81 mg EC tablet    hxpq-xjgvzme-htn-hydrocort (CORTISPORIN) 1 % ointment    buPROPion (WELLBUTRIN XL) 300 mg 24 hr tablet    cyclobenzaprine (FLEXERIL) 5 mg tablet    estrogens, conjugated,-methyltestosterone (ESTRATEST HS) 0.625-1.25 MG per tablet     estrogens, conjugated,-methyltestosterone (ESTRATEST HS) 0.625-1.25 MG per tablet    fexofenadine-pseudoephedrine (ALLEGRA-D 24) 180-240 MG per 24 hr tablet    gabapentin (NEURONTIN) 300 mg capsule    Linzess 290 MCG CAPS    metroNIDAZOLE (METROGEL) 0.75 % gel    Milnacipran HCl 50 MG TABS    mupirocin (BACTROBAN) 2 % ointment    nitrofurantoin (MACROBID) 100 mg capsule    senna-docusate sodium (SENOKOT S) 8.6-50 mg per tablet    traMADol (ULTRAM) 50 mg tablet    valACYclovir (VALTREX) 1,000 mg tablet    valACYclovir (VALTREX) 1,000 mg tablet    Allergies   Allergen Reactions    Ciprofloxacin GI Intolerance    Nsaids GI Bleeding and GI Intolerance     Previous ulcer    Penicillins Hives           Objective     There were no vitals taken for this visit. There is no height or weight on file to calculate BMI.      PHYSICAL EXAM:      General Appearance:   Alert, cooperative, no distress   HEENT:   Normocephalic, atraumatic, anicteric.     Neck:  Supple, symmetrical, trachea midline   Lungs:   Clear to auscultation bilaterally; no rales, rhonchi or wheezing; respirations unlabored    Heart::   Regular rate and rhythm; no murmur, rub, or gallop.   Abdomen:   Soft, non-tender, non-distended; normal bowel sounds; no masses, no organomegaly    Genitalia:   Deferred    Rectal:   Deferred    Extremities:  No cyanosis, clubbing or edema    Pulses:  2+ and symmetric    Skin:  No jaundice, rashes, or lesions    Lymph nodes:  No palpable cervical lymphadenopathy        Lab Results:   No visits with results within 1 Day(s) from this visit.   Latest known visit with results is:   No results found for any previous visit.         Radiology Results:   No results found.

## 2025-01-27 ENCOUNTER — ANESTHESIA EVENT (OUTPATIENT)
Dept: ANESTHESIOLOGY | Facility: HOSPITAL | Age: 70
End: 2025-01-27

## 2025-01-27 ENCOUNTER — ANESTHESIA (OUTPATIENT)
Dept: ANESTHESIOLOGY | Facility: HOSPITAL | Age: 70
End: 2025-01-27

## 2025-01-28 ENCOUNTER — TELEPHONE (OUTPATIENT)
Dept: FAMILY MEDICINE CLINIC | Facility: CLINIC | Age: 70
End: 2025-01-28

## 2025-01-28 DIAGNOSIS — Z79.899 DRUG THERAPY: Primary | ICD-10-CM

## 2025-01-28 DIAGNOSIS — Z13.0 SCREENING, DEFICIENCY ANEMIA, IRON: ICD-10-CM

## 2025-01-28 DIAGNOSIS — Z13.1 SCREENING FOR DIABETES MELLITUS: ICD-10-CM

## 2025-02-06 DIAGNOSIS — Z86.0100 HX OF COLONIC POLYPS: Primary | ICD-10-CM

## 2025-02-06 RX ORDER — POLYETHYLENE GLYCOL 3350 17 G/17G
238 POWDER, FOR SOLUTION ORAL ONCE
Qty: 238 G | Refills: 0 | Status: SHIPPED | OUTPATIENT
Start: 2025-02-06 | End: 2025-02-10

## 2025-02-06 RX ORDER — BISACODYL 5 MG/1
10 TABLET, DELAYED RELEASE ORAL ONCE
Qty: 2 TABLET | Refills: 0 | Status: SHIPPED | OUTPATIENT
Start: 2025-02-06 | End: 2025-02-10

## 2025-02-10 ENCOUNTER — ANESTHESIA (OUTPATIENT)
Dept: GASTROENTEROLOGY | Facility: AMBULARY SURGERY CENTER | Age: 70
End: 2025-02-10
Payer: MEDICARE

## 2025-02-10 ENCOUNTER — HOSPITAL ENCOUNTER (OUTPATIENT)
Dept: GASTROENTEROLOGY | Facility: AMBULARY SURGERY CENTER | Age: 70
Setting detail: OUTPATIENT SURGERY
Discharge: HOME | End: 2025-02-10
Payer: MEDICARE

## 2025-02-10 VITALS
HEART RATE: 80 BPM | BODY MASS INDEX: 30.13 KG/M2 | SYSTOLIC BLOOD PRESSURE: 122 MMHG | OXYGEN SATURATION: 96 % | DIASTOLIC BLOOD PRESSURE: 72 MMHG | WEIGHT: 192 LBS | RESPIRATION RATE: 20 BRPM | HEIGHT: 67 IN | TEMPERATURE: 97.6 F

## 2025-02-10 DIAGNOSIS — Z86.0100 HX OF COLONIC POLYPS: ICD-10-CM

## 2025-02-10 DIAGNOSIS — F33.0 MILD EPISODE OF RECURRENT MAJOR DEPRESSIVE DISORDER (HCC): ICD-10-CM

## 2025-02-10 PROCEDURE — 88305 TISSUE EXAM BY PATHOLOGIST: CPT | Performed by: STUDENT IN AN ORGANIZED HEALTH CARE EDUCATION/TRAINING PROGRAM

## 2025-02-10 PROCEDURE — 45385 COLONOSCOPY W/LESION REMOVAL: CPT | Performed by: INTERNAL MEDICINE

## 2025-02-10 RX ORDER — SODIUM CHLORIDE, SODIUM LACTATE, POTASSIUM CHLORIDE, CALCIUM CHLORIDE 600; 310; 30; 20 MG/100ML; MG/100ML; MG/100ML; MG/100ML
INJECTION, SOLUTION INTRAVENOUS CONTINUOUS PRN
Status: DISCONTINUED | OUTPATIENT
Start: 2025-02-10 | End: 2025-02-10

## 2025-02-10 RX ORDER — PROPOFOL 10 MG/ML
INJECTION, EMULSION INTRAVENOUS AS NEEDED
Status: DISCONTINUED | OUTPATIENT
Start: 2025-02-10 | End: 2025-02-10

## 2025-02-10 RX ORDER — LIDOCAINE HYDROCHLORIDE 10 MG/ML
INJECTION, SOLUTION EPIDURAL; INFILTRATION; INTRACAUDAL; PERINEURAL AS NEEDED
Status: DISCONTINUED | OUTPATIENT
Start: 2025-02-10 | End: 2025-02-10

## 2025-02-10 RX ORDER — SODIUM CHLORIDE, SODIUM LACTATE, POTASSIUM CHLORIDE, CALCIUM CHLORIDE 600; 310; 30; 20 MG/100ML; MG/100ML; MG/100ML; MG/100ML
125 INJECTION, SOLUTION INTRAVENOUS CONTINUOUS
Status: DISCONTINUED | OUTPATIENT
Start: 2025-02-10 | End: 2025-02-14 | Stop reason: HOSPADM

## 2025-02-10 RX ADMIN — SODIUM CHLORIDE, SODIUM LACTATE, POTASSIUM CHLORIDE, AND CALCIUM CHLORIDE 125 ML/HR: .6; .31; .03; .02 INJECTION, SOLUTION INTRAVENOUS at 07:31

## 2025-02-10 RX ADMIN — LIDOCAINE HYDROCHLORIDE 50 MG: 10 INJECTION, SOLUTION EPIDURAL; INFILTRATION; INTRACAUDAL at 08:05

## 2025-02-10 RX ADMIN — PROPOFOL 160 MCG/KG/MIN: 10 INJECTION, EMULSION INTRAVENOUS at 08:06

## 2025-02-10 RX ADMIN — SODIUM CHLORIDE, SODIUM LACTATE, POTASSIUM CHLORIDE, AND CALCIUM CHLORIDE: .6; .31; .03; .02 INJECTION, SOLUTION INTRAVENOUS at 07:59

## 2025-02-10 RX ADMIN — PROPOFOL 50 MG: 10 INJECTION, EMULSION INTRAVENOUS at 08:07

## 2025-02-10 RX ADMIN — PROPOFOL 100 MG: 10 INJECTION, EMULSION INTRAVENOUS at 08:05

## 2025-02-10 NOTE — H&P
History and Physical - [facility]  Tere Dee 69 y.o. female MRN: [id number]                  HPI: Tere Dee is a 69 y.o. year old female who presents for history of colon polyps.      REVIEW OF SYSTEMS: Per the HPI, and otherwise unremarkable.    Historical Information   Past Medical History:   Diagnosis Date    Allergic     Arthritis     Colon polyp     Depression     Fibromyalgia     Inflammatory bowel disease     Visual impairment      Past Surgical History:   Procedure Laterality Date    HYSTERECTOMY      REPAIR RECTOCELE      REPLACEMENT TOTAL KNEE Right 2023     Social History   Social History     Substance and Sexual Activity   Alcohol Use Yes    Comment: couple times per year     Social History     Substance and Sexual Activity   Drug Use Never     Social History     Tobacco Use   Smoking Status Former    Current packs/day: 0.00    Average packs/day: 1 pack/day for 10.0 years (10.0 ttl pk-yrs)    Types: Cigarettes    Start date: 1971    Quit date:     Years since quittin.1   Smokeless Tobacco Never     Family History   Problem Relation Age of Onset    Cancer Mother     Pancreatic cancer Mother     Dementia Father     Heart disease Father     Arthritis Father     Skin cancer Father     Skin cancer Sister     Cancer Maternal Grandmother     No Known Problems Maternal Grandfather     No Known Problems Paternal Grandmother     No Known Problems Paternal Grandfather     Alcohol abuse Brother        Meds/Allergies       Current Outpatient Medications:     acetaminophen (TYLENOL) 500 mg tablet    buPROPion (WELLBUTRIN XL) 300 mg 24 hr tablet    Cholecalciferol (VITAMIN D-3 PO)    cyclobenzaprine (FLEXERIL) 5 mg tablet    fexofenadine-pseudoephedrine (ALLEGRA-D 24) 180-240 MG per 24 hr tablet    gabapentin (NEURONTIN) 300 mg capsule    Linzess 290 MCG CAPS    Milnacipran HCl 50 MG TABS    riuw-ftaiyhs-zsq-hydrocort (CORTISPORIN) 1 % ointment    estrogens,  "conjugated,-methyltestosterone (ESTRATEST HS) 0.625-1.25 MG per tablet    metroNIDAZOLE (METROGEL) 0.75 % gel    mupirocin (BACTROBAN) 2 % ointment    traMADol (ULTRAM) 50 mg tablet    valACYclovir (VALTREX) 1,000 mg tablet    Current Facility-Administered Medications:     lactated ringers infusion, 125 mL/hr, Intravenous, Continuous, 125 mL/hr at 02/10/25 0731    Allergies   Allergen Reactions    Ciprofloxacin GI Intolerance    Nsaids GI Bleeding and GI Intolerance     Previous ulcer    Penicillins Hives       Objective     /72   Pulse 94   Temp (!) 96.9 °F (36.1 °C) (Temporal)   Resp 16   Ht 5' 7\" (1.702 m)   Wt 87.1 kg (192 lb)   SpO2 98%   BMI 30.07 kg/m²       PHYSICAL EXAM    Gen: NAD  Head: NCAT  CV: RRR  CHEST: Clear  ABD: soft, NT/ND  EXT: no edema      ASSESSMENT/PLAN:  This is a 69 y.o. year old female here for colonoscopy, and she is stable and optimized for her procedure.        "

## 2025-02-10 NOTE — ANESTHESIA PREPROCEDURE EVALUATION
Procedure:  COLONOSCOPY    Relevant Problems   ANESTHESIA (within normal limits)      CARDIO (within normal limits)      ENDO (within normal limits)      GI/HEPATIC (within normal limits)      /RENAL (within normal limits)      GYN (within normal limits)      HEMATOLOGY (within normal limits)      MUSCULOSKELETAL   (+) Fibromyalgia      NEURO/PSYCH   (+) Continuous opioid dependence (HCC)   (+) Depression   (+) Fibromyalgia      PULMONARY (within normal limits)        Physical Exam    Airway    Mallampati score: II         Dental       Cardiovascular  Cardiovascular exam normal    Pulmonary  Pulmonary exam normal     Other Findings  post-pubertal.      Anesthesia Plan  ASA Score- 2     Anesthesia Type- IV sedation with anesthesia with ASA Monitors.         Additional Monitors:     Airway Plan:            Plan Factors-Exercise tolerance (METS): >4 METS.    Chart reviewed.    Patient summary reviewed.    Patient is not a current smoker. Patient not instructed to abstain from smoking on day of procedure. Patient did not smoke on day of surgery.            Induction-     Postoperative Plan-         Informed Consent- Anesthetic plan and risks discussed with patient.  I personally reviewed this patient with the CRNA. Discussed and agreed on the Anesthesia Plan with the CRNA..      NPO Status:  Vitals Value Taken Time   Date of last liquid 02/10/25 02/10/25 0716   Time of last liquid 0300 02/10/25 0716   Date of last solid 02/08/25 02/10/25 0716   Time of last solid 1300 02/10/25 0716

## 2025-02-10 NOTE — ANESTHESIA POSTPROCEDURE EVALUATION
Post-Op Assessment Note    CV Status:  Stable    Pain management: adequate       Mental Status:  Sleepy   Hydration Status:  Euvolemic   PONV Controlled:  Controlled   Airway Patency:  Patent     Post Op Vitals Reviewed: Yes    No anethesia notable event occurred.    Staff: CRNA           Last Filed PACU Vitals:  Vitals Value Taken Time   Temp 97.6 °F (36.4 °C) 02/10/25 0827   Pulse 82 02/10/25 0827   /68 02/10/25 0827   Resp 19 02/10/25 0827   SpO2 97 % 02/10/25 0827       Modified Candelario:     Vitals Value Taken Time   Activity 0 02/10/25 0828   Respiration 2 02/10/25 0828   Circulation 2 02/10/25 0828   Consciousness 0 02/10/25 0828   Oxygen Saturation 2 02/10/25 0828     Modified Candelario Score: 6

## 2025-02-11 ENCOUNTER — RESULTS FOLLOW-UP (OUTPATIENT)
Dept: GASTROENTEROLOGY | Facility: AMBULARY SURGERY CENTER | Age: 70
End: 2025-02-11

## 2025-02-11 PROCEDURE — 88305 TISSUE EXAM BY PATHOLOGIST: CPT | Performed by: STUDENT IN AN ORGANIZED HEALTH CARE EDUCATION/TRAINING PROGRAM

## 2025-02-11 RX ORDER — BUPROPION HYDROCHLORIDE 300 MG/1
300 TABLET ORAL DAILY
Qty: 90 TABLET | Refills: 1 | Status: SHIPPED | OUTPATIENT
Start: 2025-02-11

## 2025-05-16 ENCOUNTER — TELEPHONE (OUTPATIENT)
Age: 70
End: 2025-05-16

## 2025-05-16 DIAGNOSIS — J06.9 ACUTE URI: Primary | ICD-10-CM

## 2025-05-16 RX ORDER — AZITHROMYCIN 250 MG/1
TABLET, FILM COATED ORAL
Qty: 6 TABLET | Refills: 0 | Status: SHIPPED | OUTPATIENT
Start: 2025-05-16 | End: 2025-05-21

## 2025-05-16 NOTE — TELEPHONE ENCOUNTER
Patient calling to say that's he is starting to get symptoms that her  started with and she would like to start precautionary measure incase she gets really sick over the weekend.fatigue slight chest congestion  Rox Beckwith

## 2025-05-29 RX ORDER — MONTELUKAST SODIUM 10 MG/1
TABLET ORAL EVERY 24 HOURS
COMMUNITY

## 2025-05-29 RX ORDER — ALBUTEROL SULFATE 90 UG/1
2 INHALANT RESPIRATORY (INHALATION) EVERY 6 HOURS PRN
COMMUNITY
Start: 2025-05-28

## 2025-05-29 RX ORDER — NEOMYCIN SULFATE, POLYMYXIN B SULFATE AND DEXAMETHASONE 3.5; 10000; 1 MG/ML; [USP'U]/ML; MG/ML
SUSPENSION/ DROPS OPHTHALMIC
COMMUNITY
Start: 2025-04-23

## 2025-05-30 ENCOUNTER — OFFICE VISIT (OUTPATIENT)
Dept: FAMILY MEDICINE CLINIC | Facility: CLINIC | Age: 70
End: 2025-05-30
Payer: MEDICARE

## 2025-05-30 VITALS
HEART RATE: 81 BPM | HEIGHT: 67 IN | WEIGHT: 186.2 LBS | RESPIRATION RATE: 17 BRPM | SYSTOLIC BLOOD PRESSURE: 118 MMHG | BODY MASS INDEX: 29.22 KG/M2 | TEMPERATURE: 97.7 F | OXYGEN SATURATION: 98 % | DIASTOLIC BLOOD PRESSURE: 66 MMHG

## 2025-05-30 DIAGNOSIS — J22 LOWER RESPIRATORY TRACT INFECTIOUS DISEASE: Primary | ICD-10-CM

## 2025-05-30 PROCEDURE — 99213 OFFICE O/P EST LOW 20 MIN: CPT | Performed by: NURSE PRACTITIONER

## 2025-05-30 PROCEDURE — G2211 COMPLEX E/M VISIT ADD ON: HCPCS | Performed by: NURSE PRACTITIONER

## 2025-05-30 NOTE — PROGRESS NOTES
"Name: Tere Dee      : 1955      MRN: [id number]  Encounter Provider: MIKAL Price  Encounter Date: 2025   Encounter department: [facility]  :  Assessment & Plan  Lower respiratory tract infectious disease  I prescribed antibiotic few weeks ago-patient did not start  She will start antibiotic immediately for suspected early pneumonia left lower lobe  Continue expectorant, fluids, rest  Recheck 2 weeks or sooner for worsening symptoms              History of Present Illness   URI   This is a new problem. The current episode started 1 to 4 weeks ago. Progression since onset: Signs of second sickness-coughing started 2 weeks after exposed to  who was also sick.  Saw her rheumatologist few days ago who advised abnormal lung sounds. There has been no fever. Associated symptoms include congestion, coughing and sinus pain. Pertinent negatives include no abdominal pain or diarrhea. Treatments tried: Nasal rinses, expectorant. The treatment provided no relief.     Review of Systems   Constitutional:  Positive for fatigue. Negative for fever.   HENT:  Positive for congestion, postnasal drip and sinus pain.    Respiratory:  Positive for cough.    Cardiovascular: Negative.    Gastrointestinal:  Negative for abdominal pain and diarrhea.   Neurological:  Negative for dizziness and weakness.       Objective   /66 (BP Location: Left arm, Patient Position: Sitting, Cuff Size: Standard)   Pulse 81   Temp 97.7 °F (36.5 °C) (Temporal)   Resp 17   Ht 5' 7\" (1.702 m)   Wt 84.5 kg (186 lb 3.2 oz)   SpO2 98%   BMI 29.16 kg/m²      Physical Exam  Vitals and nursing note reviewed.   Constitutional:       General: She is not in acute distress.     Appearance: Normal appearance.   HENT:      Nose: Congestion present.      Right Sinus: Maxillary sinus tenderness present.      Left Sinus: Maxillary sinus tenderness present.      Mouth/Throat:      Pharynx: Postnasal drip present. "     Cardiovascular:      Rate and Rhythm: Normal rate and regular rhythm.   Pulmonary:      Breath sounds: Examination of the left-lower field reveals rhonchi. Rhonchi present.   Lymphadenopathy:      Cervical: No cervical adenopathy.     Skin:     General: Skin is warm and dry.     Neurological:      General: No focal deficit present.      Mental Status: She is alert. Mental status is at baseline.

## 2025-06-17 ENCOUNTER — OFFICE VISIT (OUTPATIENT)
Dept: FAMILY MEDICINE CLINIC | Facility: CLINIC | Age: 70
End: 2025-06-17
Payer: MEDICARE

## 2025-06-17 VITALS
DIASTOLIC BLOOD PRESSURE: 68 MMHG | WEIGHT: 184.6 LBS | SYSTOLIC BLOOD PRESSURE: 124 MMHG | TEMPERATURE: 97.9 F | BODY MASS INDEX: 28.97 KG/M2 | RESPIRATION RATE: 17 BRPM | HEIGHT: 67 IN

## 2025-06-17 DIAGNOSIS — N39.3 FEMALE STRESS INCONTINENCE: ICD-10-CM

## 2025-06-17 DIAGNOSIS — Z13.220 LIPID SCREENING: ICD-10-CM

## 2025-06-17 DIAGNOSIS — Z00.00 MEDICARE ANNUAL WELLNESS VISIT, SUBSEQUENT: Primary | ICD-10-CM

## 2025-06-17 DIAGNOSIS — Z13.1 SCREENING FOR DIABETES MELLITUS: ICD-10-CM

## 2025-06-17 DIAGNOSIS — Z12.31 BREAST CANCER SCREENING BY MAMMOGRAM: ICD-10-CM

## 2025-06-17 PROBLEM — F11.20 CONTINUOUS OPIOID DEPENDENCE (HCC): Status: RESOLVED | Noted: 2022-11-29 | Resolved: 2025-06-17

## 2025-06-17 PROCEDURE — G0439 PPPS, SUBSEQ VISIT: HCPCS | Performed by: NURSE PRACTITIONER

## 2025-06-17 RX ORDER — ESTRADIOL 0.04 MG/D
PATCH, EXTENDED RELEASE TRANSDERMAL
COMMUNITY
Start: 2025-05-27

## 2025-06-17 RX ORDER — PROGESTERONE 100 MG/1
CAPSULE ORAL
COMMUNITY
Start: 2025-05-27

## 2025-06-17 NOTE — PATIENT INSTRUCTIONS
Medicare Preventive Visit Patient Instructions  Thank you for completing your Welcome to Medicare Visit or Medicare Annual Wellness Visit today. Your next wellness visit will be due in one year (6/18/2026).  The screening/preventive services that you may require over the next 5-10 years are detailed below. Some tests may not apply to you based off risk factors and/or age. Screening tests ordered at today's visit but not completed yet may show as past due. Also, please note that scanned in results may not display below.  Preventive Screenings:  Service Recommendations Previous Testing/Comments   Colorectal Cancer Screening  * Colonoscopy    * Fecal Occult Blood Test (FOBT)/Fecal Immunochemical Test (FIT)  * Fecal DNA/Cologuard Test  * Flexible Sigmoidoscopy Age: 45-75 years old   Colonoscopy: every 10 years (may be performed more frequently if at higher risk)  OR  FOBT/FIT: every 1 year  OR  Cologuard: every 3 years  OR  Sigmoidoscopy: every 5 years  Screening may be recommended earlier than age 45 if at higher risk for colorectal cancer. Also, an individualized decision between you and your healthcare provider will decide whether screening between the ages of 76-85 would be appropriate. Colonoscopy: 02/10/2025  FOBT/FIT: Not on file  Cologuard: Not on file  Sigmoidoscopy: Not on file    Screening Current     Breast Cancer Screening Age: 40+ years old  Frequency: every 1-2 years  Not required if history of left and right mastectomy Mammogram: 04/05/2024    Screening Current   Cervical Cancer Screening Between the ages of 21-29, pap smear recommended once every 3 years.   Between the ages of 30-65, can perform pap smear with HPV co-testing every 5 years.   Recommendations may differ for women with a history of total hysterectomy, cervical cancer, or abnormal pap smears in past. Pap Smear: Not on file    Screening Not Indicated   Hepatitis C Screening Once for adults born between 1945 and 1965  More frequently in  patients at high risk for Hepatitis C Hep C Antibody: Not on file        Diabetes Screening 1-2 times per year if you're at risk for diabetes or have pre-diabetes Fasting glucose: No results in last 5 years (No results in last 5 years)  A1C: No results in last 5 years (No results in last 5 years)      Cholesterol Screening Once every 5 years if you don't have a lipid disorder. May order more often based on risk factors. Lipid panel: Not on file          Other Preventive Screenings Covered by Medicare:  Abdominal Aortic Aneurysm (AAA) Screening: covered once if your at risk. You're considered to be at risk if you have a family history of AAA.  Lung Cancer Screening: covers low dose CT scan once per year if you meet all of the following conditions: (1) Age 55-77; (2) No signs or symptoms of lung cancer; (3) Current smoker or have quit smoking within the last 15 years; (4) You have a tobacco smoking history of at least 20 pack years (packs per day multiplied by number of years you smoked); (5) You get a written order from a healthcare provider.  Glaucoma Screening: covered annually if you're considered high risk: (1) You have diabetes OR (2) Family history of glaucoma OR (3)  aged 50 and older OR (4)  American aged 65 and older  Osteoporosis Screening: covered every 2 years if you meet one of the following conditions: (1) You're estrogen deficient and at risk for osteoporosis based off medical history and other findings; (2) Have a vertebral abnormality; (3) On glucocorticoid therapy for more than 3 months; (4) Have primary hyperparathyroidism; (5) On osteoporosis medications and need to assess response to drug therapy.   Last bone density test (DXA Scan): 03/08/2023.  HIV Screening: covered annually if you're between the age of 15-65. Also covered annually if you are younger than 15 and older than 65 with risk factors for HIV infection. For pregnant patients, it is covered up to 3 times per  pregnancy.    Immunizations:  Immunization Recommendations   Influenza Vaccine Annual influenza vaccination during flu season is recommended for all persons aged >= 6 months who do not have contraindications   Pneumococcal Vaccine   * Pneumococcal conjugate vaccine = PCV13 (Prevnar 13), PCV15 (Vaxneuvance), PCV20 (Prevnar 20)  * Pneumococcal polysaccharide vaccine = PPSV23 (Pneumovax) Adults 19-65 yo with certain risk factors or if 65+ yo  If never received any pneumonia vaccine: recommend Prevnar 20 (PCV20)  Give PCV20 if previously received 1 dose of PCV13 or PPSV23   Hepatitis B Vaccine 3 dose series if at intermediate or high risk (ex: diabetes, end stage renal disease, liver disease)   Respiratory syncytial virus (RSV) Vaccine - COVERED BY MEDICARE PART D  * RSVPreF3 (Arexvy) CDC recommends that adults 60 years of age and older may receive a single dose of RSV vaccine using shared clinical decision-making (SCDM)   Tetanus (Td) Vaccine - COST NOT COVERED BY MEDICARE PART B Following completion of primary series, a booster dose should be given every 10 years to maintain immunity against tetanus. Td may also be given as tetanus wound prophylaxis.   Tdap Vaccine - COST NOT COVERED BY MEDICARE PART B Recommended at least once for all adults. For pregnant patients, recommended with each pregnancy.   Shingles Vaccine (Shingrix) - COST NOT COVERED BY MEDICARE PART B  2 shot series recommended in those 19 years and older who have or will have weakened immune systems or those 50 years and older     Health Maintenance Due:      Topic Date Due   • Hepatitis C Screening  Never done   • Breast Cancer Screening: Mammogram  04/05/2025   • Colorectal Cancer Screening  02/09/2030     Immunizations Due:      Topic Date Due   • COVID-19 Vaccine (7 - 2024-25 season) 09/01/2024   • Influenza Vaccine (Season Ended) 09/01/2025     Advance Directives   What are advance directives?  Advance directives are legal documents that state your  wishes and plans for medical care. These plans are made ahead of time in case you lose your ability to make decisions for yourself. Advance directives can apply to any medical decision, such as the treatments you want, and if you want to donate organs.   What are the types of advance directives?  There are many types of advance directives, and each state has rules about how to use them. You may choose a combination of any of the following:  Living will:  This is a written record of the treatment you want. You can also choose which treatments you do not want, which to limit, and which to stop at a certain time. This includes surgery, medicine, IV fluid, and tube feedings.   Durable power of  for healthcare (DPAHC):  This is a written record that states who you want to make healthcare choices for you when you are unable to make them for yourself. This person, called a proxy, is usually a family member or a friend. You may choose more than 1 proxy.  Do not resuscitate (DNR) order:  A DNR order is used in case your heart stops beating or you stop breathing. It is a request not to have certain forms of treatment, such as CPR. A DNR order may be included in other types of advance directives.  Medical directive:  This covers the care that you want if you are in a coma, near death, or unable to make decisions for yourself. You can list the treatments you want for each condition. Treatment may include pain medicine, surgery, blood transfusions, dialysis, IV or tube feedings, and a ventilator (breathing machine).  Values history:  This document has questions about your views, beliefs, and how you feel and think about life. This information can help others choose the care that you would choose.  Why are advance directives important?  An advance directive helps you control your care. Although spoken wishes may be used, it is better to have your wishes written down. Spoken wishes can be misunderstood, or not followed.  Treatments may be given even if you do not want them. An advance directive may make it easier for your family to make difficult choices about your care.   Fall Prevention    Fall prevention  includes ways to make your home and other areas safer. It also includes ways you can move more carefully to prevent a fall. Health conditions that cause changes in your blood pressure, vision, or muscle strength and coordination may increase your risk for falls. Medicines may also increase your risk for falls if they make you dizzy, weak, or sleepy.   Fall prevention tips:   Stand or sit up slowly.    Use assistive devices as directed.    Wear shoes that fit well and have soles that .    Wear a personal alarm.    Stay active.    Manage your medical conditions.    Home Safety Tips:  Add items to prevent falls in the bathroom.    Keep paths clear.    Install bright lights in your home.    Keep items you use often on shelves within reach.    Paint or place reflective tape on the edges of your stairs.    Urinary Incontinence   Urinary incontinence (UI)  is when you lose control of your bladder. UI develops because your bladder cannot store or empty urine properly. The 3 most common types of UI are stress incontinence, urge incontinence, or both.  Medicines:   May be given to help strengthen your bladder control. Report any side effects of medication to your healthcare provider.  Do pelvic muscle exercises often:  Your pelvic muscles help you stop urinating. Squeeze these muscles tight for 5 seconds, then relax for 5 seconds. Gradually work up to squeezing for 10 seconds. Do 3 sets of 15 repetitions a day, or as directed. This will help strengthen your pelvic muscles and improve bladder control.  Train your bladder:  Go to the bathroom at set times, such as every 2 hours, even if you do not feel the urge to go. You can also try to hold your urine when you feel the urge to go. For example, hold your urine for 5 minutes when you  feel the urge to go. As that becomes easier, hold your urine for 10 minutes.   Self-care:   Keep a UI record.  Write down how often you leak urine and how much you leak. Make a note of what you were doing when you leaked urine.  Drink liquids as directed. You may need to limit the amount of liquid you drink to help control your urine leakage. Do not drink any liquid right before you go to bed. Limit or do not have drinks that contain caffeine or alcohol.   Prevent constipation.  Eat a variety of high-fiber foods. Good examples are high-fiber cereals, beans, vegetables, and whole-grain breads. Walking is the best way to trigger your intestines to have a bowel movement.  Exercise regularly and maintain a healthy weight.  Weight loss and exercise will decrease pressure on your bladder and help you control your leakage.   Use a catheter as directed  to help empty your bladder. A catheter is a tiny, plastic tube that is put into your bladder to drain your urine.   Go to behavior therapy as directed.  Behavior therapy may be used to help you learn to control your urge to urinate.    Weight Management   Why it is important to manage your weight:  Being overweight increases your risk of health conditions such as heart disease, high blood pressure, type 2 diabetes, and certain types of cancer. It can also increase your risk for osteoarthritis, sleep apnea, and other respiratory problems. Aim for a slow, steady weight loss. Even a small amount of weight loss can lower your risk of health problems.  How to lose weight safely:  A safe and healthy way to lose weight is to eat fewer calories and get regular exercise. You can lose up about 1 pound a week by decreasing the number of calories you eat by 500 calories each day.   Healthy meal plan for weight management:  A healthy meal plan includes a variety of foods, contains fewer calories, and helps you stay healthy. A healthy meal plan includes the following:  Eat whole-grain  foods more often.  A healthy meal plan should contain fiber. Fiber is the part of grains, fruits, and vegetables that is not broken down by your body. Whole-grain foods are healthy and provide extra fiber in your diet. Some examples of whole-grain foods are whole-wheat breads and pastas, oatmeal, brown rice, and bulgur.  Eat a variety of vegetables every day.  Include dark, leafy greens such as spinach, kale, randall greens, and mustard greens. Eat yellow and orange vegetables such as carrots, sweet potatoes, and winter squash.   Eat a variety of fruits every day.  Choose fresh or canned fruit (canned in its own juice or light syrup) instead of juice. Fruit juice has very little or no fiber.  Eat low-fat dairy foods.  Drink fat-free (skim) milk or 1% milk. Eat fat-free yogurt and low-fat cottage cheese. Try low-fat cheeses such as mozzarella and other reduced-fat cheeses.  Choose meat and other protein foods that are low in fat.  Choose beans or other legumes such as split peas or lentils. Choose fish, skinless poultry (chicken or turkey), or lean cuts of red meat (beef or pork). Before you cook meat or poultry, cut off any visible fat.   Use less fat and oil.  Try baking foods instead of frying them. Add less fat, such as margarine, sour cream, regular salad dressing and mayonnaise to foods. Eat fewer high-fat foods. Some examples of high-fat foods include french fries, doughnuts, ice cream, and cakes.  Eat fewer sweets.  Limit foods and drinks that are high in sugar. This includes candy, cookies, regular soda, and sweetened drinks.  Exercise:  Exercise at least 30 minutes per day on most days of the week. Some examples of exercise include walking, biking, dancing, and swimming. You can also fit in more physical activity by taking the stairs instead of the elevator or parking farther away from stores. Ask your healthcare provider about the best exercise plan for you.   Narcotic (Opioid) Safety    Use narcotics  safely:  Take prescribed narcotics exactly as directed  Do not give narcotics to others or take narcotics that belong to someone else  Do not mix narcotics without medicines or alcohol  Do not drive or operate heavy machinery after you take the narcotic  Monitor for side effects and notify your healthcare provider if you experienced side effects such as nausea, sleepiness, itching, or trouble thinking clearly.    Manage constipation:    Constipation is the most common side effect of narcotic medicine. Constipation is when you have hard, dry bowel movements, or you go longer than usual between bowel movements. Tell your healthcare provider about all changes in your bowel movements while you are taking narcotics. He or she may recommend laxative medicine to help you have a bowel movement. He or she may also change the kind of narcotic you are taking, or change when you take it. The following are more ways you can prevent or relieve constipation:    Drink liquids as directed.  You may need to drink extra liquids to help soften and move your bowels. Ask how much liquid to drink each day and which liquids are best for you.  Eat high-fiber foods.  This may help decrease constipation by adding bulk to your bowel movements. High-fiber foods include fruits, vegetables, whole-grain breads and cereals, and beans. Your healthcare provider or dietitian can help you create a high-fiber meal plan. Your provider may also recommend a fiber supplement if you cannot get enough fiber from food.  Exercise regularly.  Regular physical activity can help stimulate your intestines. Walking is a good exercise to prevent or relieve constipation. Ask which exercises are best for you.  Schedule a time each day to have a bowel movement.  This may help train your body to have regular bowel movements. Bend forward while you are on the toilet to help move the bowel movement out. Sit on the toilet for at least 10 minutes, even if you do not have a  bowel movement.    Store narcotics safely:   Store narcotics where others cannot easily get them.  Keep them in a locked cabinet or secure area. Do not  keep them in a purse or other bag you carry with you. A person may be looking for something else and find the narcotics.  Make sure narcotics are stored out of the reach of children.  A child can easily overdose on narcotics. Narcotics may look like candy to a small child.    The best way to dispose of narcotics:      The laws vary by country and area. In the [place], the best way is to return the narcotics through a take-back program. This program is offered by the [place] Drug Enforcement Agency (G[employer]). The following are options for using the program:  Take the narcotics to a G[employer] collection site.  The site is often a law enforcement center. Call your local law enforcement center for scheduled take-back days in your area. You will be given information on where to go if the collection site is in a different location.  Take the narcotics to an approved pharmacy or hospital.  A pharmacy or hospital may be set up as a collection site. You will need to ask if it is a G[employer] collection site if you were not directed there. A pharmacy or doctor's office may not be able to take back narcotics unless it is a G[employer] site.  Use a mail-back system.  This means you are given containers to put the narcotics into. You will then mail them in the containers.  Use a take-back drop box.  This is a place to leave the narcotics at any time. People and animals will not be able to get into the box. Your local law enforcement agency can tell you where to find a drop box in your area.    Other ways to manage pain:   Ask your healthcare provider about non-narcotic medicines to control pain.  Nonprescription medicines include NSAIDs (such as ibuprofen) and acetaminophen. Prescription medicines include muscle relaxers, antidepressants, and steroids.  Pain may be managed without any medicines.  Some  ways to relieve pain include massage, aromatherapy, or meditation. Physical or occupational therapy may also help.    For more information:   Drug Enforcement Administration  [address] [place] [address]  Phone: 9- 533 - 927-1029  Web Address: https://www.deadiversion.[facility].gov/drug_disposal/    [place] Food and Drug Administration  [address] [place] [address]  Phone: 6- 523 - 206-2493  Web Address: http://www.fda.gov   © Copyright [employer] 2018 Information is for End User's use only and may not be sold, redistributed or otherwise used for commercial purposes. All illustrations and images included in CareNotes® are the copyrighted property of [employer] or [employer]

## 2025-06-17 NOTE — PROGRESS NOTES
Name: Tere Dee      : 1955      MRN: [id number]  Encounter Provider: MIKAL Galicia  Encounter Date: 2025   Encounter department: [facility]  :  Assessment & Plan  Medicare annual wellness visit, subsequent         Breast cancer screening by mammogram    Orders:    Mammo screening bilateral w 3d and cad; Future    Lipid screening    Orders:    Lipid panel; Future    Screening for diabetes mellitus    Orders:    Basic metabolic panel; Future    Female stress incontinence           Depression Screening and Follow-up Plan: Patient's depression screening was positive with a PHQ-9 score of 5.   Patient assessed for underlying major depression. Brief counseling provided and recommend additional follow-up/re-evaluation next office visit.     Falls Plan of Care: balance, strength, and gait training instructions were provided. Medications that increase falls were reviewed. Assessed feet and footwear.     Urinary Incontinence Plan of Care: counseling topics discussed: use restroom every 2 hours, limiting fluid intake 3-4 hours before bed and preventing constipation.       Preventive health issues were discussed with patient, and age appropriate screening tests were ordered as noted in patient's After Visit Summary. Personalized health advice and appropriate referrals for health education or preventive services given if needed, as noted in patient's After Visit Summary.    History of Present Illness     HPI   Patient Care Team:  MIKAL Galicia as PCP - General (Family Medicine)    Review of Systems  Medical History Reviewed by provider this encounter:  Tobacco  Allergies  Meds  Problems  Med Hx  Surg Hx  Fam Hx       Annual Wellness Visit Questionnaire   eTre is here for her Subsequent Wellness visit. Last Medicare Wellness visit information reviewed, patient interviewed and updates made to the record today.      Health Risk Assessment:   Patient rates overall health  as good. Patient feels that their physical health rating is same. Patient is satisfied with their life. Eyesight was rated as same. Hearing was rated as slightly worse. Patient feels that their emotional and mental health rating is same. Patients states they are never, rarely angry. Patient states they are often unusually tired/fatigued. Pain experienced in the last 7 days has been some. Patient's pain rating has been 3/10. Patient states that she has experienced no weight loss or gain in last 6 months.     Depression Screening:   PHQ-9 Score: 5      Fall Risk Screening:   In the past year, patient has experienced: history of falling in past year    Number of falls: 1  Injured during fall?: Yes    Feels unsteady when standing or walking?: No    Worried about falling?: No      Urinary Incontinence Screening:   Patient has leaked urine accidently in the last six months.     Home Safety:  Patient does not have trouble with stairs inside or outside of their home. Patient has working smoke alarms and has working carbon monoxide detector. Home safety hazards include: none.     Nutrition:   Current diet is Low Carb. High protein- low carb    Medications:   Patient is currently taking over-the-counter supplements. OTC medications include: see medication list. Patient is able to manage medications.     Activities of Daily Living (ADLs)/Instrumental Activities of Daily Living (IADLs):   Walk and transfer into and out of bed and chair?: Yes  Dress and groom yourself?: Yes    Bathe or shower yourself?: Yes    Feed yourself? Yes  Do your laundry/housekeeping?: Yes  Manage your money, pay your bills and track your expenses?: Yes  Make your own meals?: Yes    Do your own shopping?: Yes    Previous Hospitalizations:   Any hospitalizations or ED visits within the last 12 months?: No      Advance Care Planning:   Living will: No    Durable POA for healthcare: No    Advanced directive: No    Advanced directive counseling given: Yes     ACP document given: Yes      Cognitive Screening:   Provider or family/friend/caregiver concerned regarding cognition?: No    Preventive Screenings      Cardiovascular Screening:    General: Risks and Benefits Discussed    Due for: Lipid Panel      Diabetes Screening:     General: Screening Current      Colorectal Cancer Screening:     General: Screening Current      Breast Cancer Screening:     General: Risks and Benefits Discussed    Due for: Mammogram        Cervical Cancer Screening:    General: Screening Not Indicated      Osteoporosis Screening:    General: Screening Current      Abdominal Aortic Aneurysm (AAA) Screening:        General: Screening Not Indicated      Lung Cancer Screening:     General: Screening Not Indicated      Hepatitis C Screening:    General: Screening Current    Immunizations:  - Immunizations due: Zoster (Shingrix)    Screening, Brief Intervention, and Referral to Treatment (SBIRT)     Screening  Typical number of drinks in a day: 0  Typical number of drinks in a week: 0  Interpretation: Low risk drinking behavior.    Single Item Drug Screening:  How often have you used an illegal drug (including marijuana) or a prescription medication for non-medical reasons in the past year? never    Single Item Drug Screen Score: 0  Interpretation: Negative screen for possible drug use disorder    Brief Intervention  Alcohol & drug use screenings were reviewed. No concerns regarding substance use disorder identified.     Review of Current Opioid Use    Opioid Risk Tool (ORT) Interpretation: Complete Opioid Risk Tool (ORT)    Other Counseling Topics:   Regular weightbearing exercise and calcium and vitamin D intake.     Social Drivers of Health     Financial Resource Strain: Low Risk  (8/21/2023)    [facility]    Overall Financial Resource Strain (CARDIA)     Difficulty of Paying Living Expenses: Not very hard   Food Insecurity: No Food Insecurity (6/17/2025)     "Nursing - Inadequate Food Risk Classification     Worried About Running Out of Food in the Last Year: Never true     Ran Out of Food in the Last Year: Never true   Transportation Needs: No Transportation Needs (6/17/2025)    PRAPARE - Transportation     Lack of Transportation (Medical): No     Lack of Transportation (Non-Medical): No   Housing Stability: Low Risk  (6/17/2025)    Housing Stability Vital Sign     Unable to Pay for Housing in the Last Year: No     Number of Times Moved in the Last Year: 1     Homeless in the Last Year: No   Utilities: Not At Risk (6/17/2025)    [facility] Utilities     Threatened with loss of utilities: No     No results found.    Objective   /68 (BP Location: Left arm, Patient Position: Sitting, Cuff Size: Large)   Temp 97.9 °F (36.6 °C) (Temporal)   Resp 17   Ht 5' 7\" (1.702 m)   Wt 83.7 kg (184 lb 9.6 oz)   BMI 28.91 kg/m²     Physical Exam  Vitals and nursing note reviewed.   Constitutional:       Appearance: Normal appearance.     Cardiovascular:      Rate and Rhythm: Normal rate and regular rhythm.   Pulmonary:      Effort: Pulmonary effort is normal.      Breath sounds: Normal breath sounds.   Lymphadenopathy:      Cervical: No cervical adenopathy.     Neurological:      General: No focal deficit present.      Mental Status: She is alert. Mental status is at baseline.         "

## 2025-06-26 ENCOUNTER — TELEPHONE (OUTPATIENT)
Age: 70
End: 2025-06-26

## 2025-06-26 NOTE — TELEPHONE ENCOUNTER
Patient calling to follow up from Primary Care Provider OV 6/17 when she started with URI to keep Primary Care Provider updated. Patient reports she is still not feeing better. Remains fatigued and run down as well as coughing up thick green mucous. Denies fevers.She did reach out out to ENT and is unable to see for another month.   Patient scheduled 6/27 at 1200 with Primary Care Provider for a re-evaluation.

## 2025-06-27 ENCOUNTER — OFFICE VISIT (OUTPATIENT)
Dept: FAMILY MEDICINE CLINIC | Facility: CLINIC | Age: 70
End: 2025-06-27
Payer: MEDICARE

## 2025-06-27 VITALS
TEMPERATURE: 98 F | SYSTOLIC BLOOD PRESSURE: 124 MMHG | HEIGHT: 67 IN | RESPIRATION RATE: 17 BRPM | BODY MASS INDEX: 28.91 KG/M2 | OXYGEN SATURATION: 97 % | HEART RATE: 77 BPM | DIASTOLIC BLOOD PRESSURE: 72 MMHG

## 2025-06-27 DIAGNOSIS — J01.00 SUBACUTE MAXILLARY SINUSITIS: Primary | ICD-10-CM

## 2025-06-27 PROCEDURE — G2211 COMPLEX E/M VISIT ADD ON: HCPCS | Performed by: NURSE PRACTITIONER

## 2025-06-27 PROCEDURE — 99213 OFFICE O/P EST LOW 20 MIN: CPT | Performed by: NURSE PRACTITIONER

## 2025-06-27 RX ORDER — LEVOFLOXACIN 250 MG/1
250 TABLET, FILM COATED ORAL DAILY
Qty: 7 TABLET | Refills: 0 | Status: SHIPPED | OUTPATIENT
Start: 2025-06-27 | End: 2025-07-04

## 2025-06-27 RX ORDER — MECLIZINE HCL 12.5 MG 12.5 MG/1
12.5 TABLET ORAL
Qty: 30 TABLET | Refills: 0 | Status: SHIPPED | OUTPATIENT
Start: 2025-06-27

## 2025-06-27 NOTE — PROGRESS NOTES
"Name: Tere Dee      : 1955      MRN: [id number]  Encounter Provider: MIKAL Price  Encounter Date: 2025   [facility]  :  Assessment & Plan  Subacute maxillary sinusitis  Supportive care measures  Add antibiotics  Call with update  Orders:    meclizine (ANTIVERT) 12.5 MG tablet; Take 1 tablet (12.5 mg total) by mouth daily at bedtime as needed for dizziness    levofloxacin (LEVAQUIN) 250 mg tablet; Take 1 tablet (250 mg total) by mouth daily for 7 days           History of Present Illness   Sinusitis  This is a recurrent problem. The current episode started 1 to 4 weeks ago. The problem is unchanged. There has been no fever. Associated symptoms include congestion, coughing and sinus pressure. Pertinent negatives include no shortness of breath or sore throat. (Green phlegm) Past treatments include oral decongestants (oral antihistamines, nasal sprays). The treatment provided mild relief.     Review of Systems   Constitutional:  Positive for fatigue. Negative for fever.   HENT:  Positive for congestion, postnasal drip, sinus pressure and sinus pain. Negative for sore throat.    Respiratory:  Positive for cough. Negative for shortness of breath.    Cardiovascular: Negative.    Neurological:  Negative for dizziness and weakness.       Objective   /72 (BP Location: Left arm, Patient Position: Sitting, Cuff Size: Standard)   Pulse 77   Temp 98 °F (36.7 °C) (Temporal)   Resp 17   Ht 5' 7\" (1.702 m)   SpO2 97%   BMI 28.91 kg/m²      Physical Exam  Vitals and nursing note reviewed.   Constitutional:       General: She is not in acute distress.     Appearance: Normal appearance.   HENT:      Nose:      Right Sinus: Maxillary sinus tenderness present.      Left Sinus: Maxillary sinus tenderness present.     Cardiovascular:      Rate and Rhythm: Normal rate and regular rhythm.      Pulses: Normal pulses.   Pulmonary:      Effort: Pulmonary effort " is normal.      Breath sounds: Normal breath sounds. No wheezing or rhonchi.   Lymphadenopathy:      Cervical: No cervical adenopathy.     Skin:     General: Skin is warm and dry.      Coloration: Skin is not pale.     Neurological:      General: No focal deficit present.      Mental Status: She is alert. Mental status is at baseline.